# Patient Record
Sex: MALE | Race: WHITE | NOT HISPANIC OR LATINO | Employment: FULL TIME | URBAN - METROPOLITAN AREA
[De-identification: names, ages, dates, MRNs, and addresses within clinical notes are randomized per-mention and may not be internally consistent; named-entity substitution may affect disease eponyms.]

---

## 2017-01-26 PROBLEM — R79.89 LOW TESTOSTERONE LEVEL IN MALE: Status: ACTIVE | Noted: 2017-01-26

## 2017-01-26 PROBLEM — E78.2 MIXED DYSLIPIDEMIA: Status: ACTIVE | Noted: 2017-01-26

## 2017-01-26 PROBLEM — I10 BENIGN ESSENTIAL HTN: Status: ACTIVE | Noted: 2017-01-26

## 2017-08-10 ENCOUNTER — HOSPITAL ENCOUNTER (OUTPATIENT)
Dept: RADIOLOGY | Facility: MEDICAL CENTER | Age: 52
End: 2017-08-10

## 2017-08-13 ENCOUNTER — HOSPITAL ENCOUNTER (INPATIENT)
Facility: MEDICAL CENTER | Age: 52
LOS: 2 days | DRG: 472 | End: 2017-08-15
Attending: NEUROLOGICAL SURGERY | Admitting: NEUROLOGICAL SURGERY
Payer: COMMERCIAL

## 2017-08-13 ENCOUNTER — APPOINTMENT (OUTPATIENT)
Dept: RADIOLOGY | Facility: MEDICAL CENTER | Age: 52
DRG: 472 | End: 2017-08-13
Attending: NEUROLOGICAL SURGERY
Payer: COMMERCIAL

## 2017-08-13 PROBLEM — M50.00 INTERVERTEBRAL CERVICAL DISC DISORDER WITH MYELOPATHY, CERVICAL REGION: Status: ACTIVE | Noted: 2017-08-13

## 2017-08-13 LAB — EKG IMPRESSION: NORMAL

## 2017-08-13 PROCEDURE — 700111 HCHG RX REV CODE 636 W/ 250 OVERRIDE (IP)

## 2017-08-13 PROCEDURE — 160002 HCHG RECOVERY MINUTES (STAT): Performed by: NEUROLOGICAL SURGERY

## 2017-08-13 PROCEDURE — 501838 HCHG SUTURE GENERAL: Performed by: NEUROLOGICAL SURGERY

## 2017-08-13 PROCEDURE — 700102 HCHG RX REV CODE 250 W/ 637 OVERRIDE(OP)

## 2017-08-13 PROCEDURE — 160009 HCHG ANES TIME/MIN: Performed by: NEUROLOGICAL SURGERY

## 2017-08-13 PROCEDURE — 502000 HCHG MISC OR IMPLANTS RC 0278: Performed by: NEUROLOGICAL SURGERY

## 2017-08-13 PROCEDURE — 700101 HCHG RX REV CODE 250

## 2017-08-13 PROCEDURE — 0RG40A0 FUSION OF CERVICOTHORACIC VERTEBRAL JOINT WITH INTERBODY FUSION DEVICE, ANTERIOR APPROACH, ANTERIOR COLUMN, OPEN APPROACH: ICD-10-PCS | Performed by: NEUROLOGICAL SURGERY

## 2017-08-13 PROCEDURE — A9270 NON-COVERED ITEM OR SERVICE: HCPCS

## 2017-08-13 PROCEDURE — 160029 HCHG SURGERY MINUTES - 1ST 30 MINS LEVEL 4: Performed by: NEUROLOGICAL SURGERY

## 2017-08-13 PROCEDURE — 160035 HCHG PACU - 1ST 60 MINS PHASE I: Performed by: NEUROLOGICAL SURGERY

## 2017-08-13 PROCEDURE — 72040 X-RAY EXAM NECK SPINE 2-3 VW: CPT

## 2017-08-13 PROCEDURE — A4314 CATH W/DRAINAGE 2-WAY LATEX: HCPCS | Performed by: NEUROLOGICAL SURGERY

## 2017-08-13 PROCEDURE — 0RB30ZZ EXCISION OF CERVICAL VERTEBRAL DISC, OPEN APPROACH: ICD-10-PCS | Performed by: NEUROLOGICAL SURGERY

## 2017-08-13 PROCEDURE — 0RP104Z REMOVAL OF INTERNAL FIXATION DEVICE FROM CERVICAL VERTEBRAL JOINT, OPEN APPROACH: ICD-10-PCS | Performed by: NEUROLOGICAL SURGERY

## 2017-08-13 PROCEDURE — 700111 HCHG RX REV CODE 636 W/ 250 OVERRIDE (IP): Performed by: NURSE PRACTITIONER

## 2017-08-13 PROCEDURE — 500364 HCHG DISSECT TOOL, MIDAS: Performed by: NEUROLOGICAL SURGERY

## 2017-08-13 PROCEDURE — 500864 HCHG NEEDLE, SPINAL 18G: Performed by: NEUROLOGICAL SURGERY

## 2017-08-13 PROCEDURE — 93010 ELECTROCARDIOGRAM REPORT: CPT | Performed by: INTERNAL MEDICINE

## 2017-08-13 PROCEDURE — 500444 HCHG HEMOSTAT, SURGICEL 2X3: Performed by: NEUROLOGICAL SURGERY

## 2017-08-13 PROCEDURE — C1713 ANCHOR/SCREW BN/BN,TIS/BN: HCPCS | Performed by: NEUROLOGICAL SURGERY

## 2017-08-13 PROCEDURE — 500122 HCHG BOVIE, BLADE: Performed by: NEUROLOGICAL SURGERY

## 2017-08-13 PROCEDURE — 500331 HCHG COTTONOID, SURG PATTIE: Performed by: NEUROLOGICAL SURGERY

## 2017-08-13 PROCEDURE — 160048 HCHG OR STATISTICAL LEVEL 1-5: Performed by: NEUROLOGICAL SURGERY

## 2017-08-13 PROCEDURE — A9270 NON-COVERED ITEM OR SERVICE: HCPCS | Performed by: NURSE PRACTITIONER

## 2017-08-13 PROCEDURE — 4A11X4G MONITORING OF PERIPHERAL NERVOUS ELECTRICAL ACTIVITY, INTRAOPERATIVE, EXTERNAL APPROACH: ICD-10-PCS | Performed by: NEUROLOGICAL SURGERY

## 2017-08-13 PROCEDURE — 160036 HCHG PACU - EA ADDL 30 MINS PHASE I: Performed by: NEUROLOGICAL SURGERY

## 2017-08-13 PROCEDURE — 770001 HCHG ROOM/CARE - MED/SURG/GYN PRIV*

## 2017-08-13 PROCEDURE — 0RG10A0 FUSION OF CERVICAL VERTEBRAL JOINT WITH INTERBODY FUSION DEVICE, ANTERIOR APPROACH, ANTERIOR COLUMN, OPEN APPROACH: ICD-10-PCS | Performed by: NEUROLOGICAL SURGERY

## 2017-08-13 PROCEDURE — 500367 HCHG DRAIN KIT, HEMOVAC: Performed by: NEUROLOGICAL SURGERY

## 2017-08-13 PROCEDURE — 500819 HCHG MARKERS, PASSIVE REFLECTIVE: Performed by: NEUROLOGICAL SURGERY

## 2017-08-13 PROCEDURE — 0RB50ZZ EXCISION OF CERVICOTHORACIC VERTEBRAL DISC, OPEN APPROACH: ICD-10-PCS | Performed by: NEUROLOGICAL SURGERY

## 2017-08-13 PROCEDURE — 502240 HCHG MISC OR SUPPLY RC 0272: Performed by: NEUROLOGICAL SURGERY

## 2017-08-13 PROCEDURE — 93005 ELECTROCARDIOGRAM TRACING: CPT | Performed by: NEUROLOGICAL SURGERY

## 2017-08-13 PROCEDURE — 700102 HCHG RX REV CODE 250 W/ 637 OVERRIDE(OP): Performed by: NURSE PRACTITIONER

## 2017-08-13 PROCEDURE — 160041 HCHG SURGERY MINUTES - EA ADDL 1 MIN LEVEL 4: Performed by: NEUROLOGICAL SURGERY

## 2017-08-13 PROCEDURE — A6402 STERILE GAUZE <= 16 SQ IN: HCPCS | Performed by: NEUROLOGICAL SURGERY

## 2017-08-13 PROCEDURE — 501423 HCHG SPONGE, SURGIFOAM 12X7: Performed by: NEUROLOGICAL SURGERY

## 2017-08-13 PROCEDURE — 502626 HCHG SURGIFLO HEMOSTATIC MATRIX 6ML: Performed by: NEUROLOGICAL SURGERY

## 2017-08-13 DEVICE — PLATE ANTERIOR CERVICAL 25MM (1TX1+2TCX1=3): Type: IMPLANTABLE DEVICE | Status: FUNCTIONAL

## 2017-08-13 DEVICE — SCREW ATL TRNS 4.0X15 SELF TAP VAR (1TX10+2TCX10=30): Type: IMPLANTABLE DEVICE | Status: FUNCTIONAL

## 2017-08-13 DEVICE — IMPLANTABLE DEVICE: Type: IMPLANTABLE DEVICE | Status: FUNCTIONAL

## 2017-08-13 RX ORDER — LIDOCAINE AND PRILOCAINE 25; 25 MG/G; MG/G
1 CREAM TOPICAL
Status: DISCONTINUED | OUTPATIENT
Start: 2017-08-13 | End: 2017-08-15 | Stop reason: HOSPADM

## 2017-08-13 RX ORDER — MORPHINE SULFATE 4 MG/ML
2-4 INJECTION, SOLUTION INTRAMUSCULAR; INTRAVENOUS
Status: DISCONTINUED | OUTPATIENT
Start: 2017-08-13 | End: 2017-08-15 | Stop reason: HOSPADM

## 2017-08-13 RX ORDER — PROMETHAZINE HYDROCHLORIDE 25 MG/1
12.5-25 TABLET ORAL EVERY 4 HOURS PRN
Status: DISCONTINUED | OUTPATIENT
Start: 2017-08-13 | End: 2017-08-15 | Stop reason: HOSPADM

## 2017-08-13 RX ORDER — ACETAMINOPHEN 500 MG
1000 TABLET ORAL EVERY 6 HOURS
Status: DISCONTINUED | OUTPATIENT
Start: 2017-08-13 | End: 2017-08-15 | Stop reason: HOSPADM

## 2017-08-13 RX ORDER — POLYETHYLENE GLYCOL 3350 17 G/17G
1 POWDER, FOR SOLUTION ORAL 2 TIMES DAILY PRN
Status: DISCONTINUED | OUTPATIENT
Start: 2017-08-13 | End: 2017-08-15 | Stop reason: HOSPADM

## 2017-08-13 RX ORDER — BISACODYL 10 MG
10 SUPPOSITORY, RECTAL RECTAL
Status: DISCONTINUED | OUTPATIENT
Start: 2017-08-13 | End: 2017-08-15 | Stop reason: HOSPADM

## 2017-08-13 RX ORDER — PROMETHAZINE HYDROCHLORIDE 25 MG/1
12.5-25 SUPPOSITORY RECTAL EVERY 4 HOURS PRN
Status: DISCONTINUED | OUTPATIENT
Start: 2017-08-13 | End: 2017-08-15 | Stop reason: HOSPADM

## 2017-08-13 RX ORDER — SODIUM CHLORIDE 9 MG/ML
INJECTION, SOLUTION INTRAVENOUS CONTINUOUS
Status: DISCONTINUED | OUTPATIENT
Start: 2017-08-13 | End: 2017-08-15 | Stop reason: HOSPADM

## 2017-08-13 RX ORDER — SODIUM CHLORIDE, SODIUM LACTATE, POTASSIUM CHLORIDE, AND CALCIUM CHLORIDE .6; .31; .03; .02 G/100ML; G/100ML; G/100ML; G/100ML
IRRIGANT IRRIGATION
Status: DISCONTINUED | OUTPATIENT
Start: 2017-08-13 | End: 2017-08-13 | Stop reason: HOSPADM

## 2017-08-13 RX ORDER — TOPIRAMATE 25 MG/1
50 TABLET ORAL EVERY 12 HOURS
Status: DISCONTINUED | OUTPATIENT
Start: 2017-08-13 | End: 2017-08-15 | Stop reason: HOSPADM

## 2017-08-13 RX ORDER — AMOXICILLIN 250 MG
2 CAPSULE ORAL 2 TIMES DAILY
Status: DISCONTINUED | OUTPATIENT
Start: 2017-08-13 | End: 2017-08-15 | Stop reason: HOSPADM

## 2017-08-13 RX ORDER — BUPIVACAINE HYDROCHLORIDE AND EPINEPHRINE 5; 5 MG/ML; UG/ML
INJECTION, SOLUTION EPIDURAL; INTRACAUDAL; PERINEURAL
Status: DISCONTINUED | OUTPATIENT
Start: 2017-08-13 | End: 2017-08-13 | Stop reason: HOSPADM

## 2017-08-13 RX ORDER — ONDANSETRON 4 MG/1
4 TABLET, ORALLY DISINTEGRATING ORAL EVERY 4 HOURS PRN
Status: DISCONTINUED | OUTPATIENT
Start: 2017-08-13 | End: 2017-08-15 | Stop reason: HOSPADM

## 2017-08-13 RX ORDER — OXYCODONE HYDROCHLORIDE 10 MG/1
5-10 TABLET ORAL
Status: DISCONTINUED | OUTPATIENT
Start: 2017-08-13 | End: 2017-08-13

## 2017-08-13 RX ORDER — DEXAMETHASONE SODIUM PHOSPHATE 4 MG/ML
4 INJECTION, SOLUTION INTRA-ARTICULAR; INTRALESIONAL; INTRAMUSCULAR; INTRAVENOUS; SOFT TISSUE EVERY 6 HOURS
Status: DISPENSED | OUTPATIENT
Start: 2017-08-13 | End: 2017-08-14

## 2017-08-13 RX ORDER — TOPIRAMATE 25 MG/1
25 TABLET ORAL 4 TIMES DAILY
Status: DISCONTINUED | OUTPATIENT
Start: 2017-08-13 | End: 2017-08-13

## 2017-08-13 RX ORDER — OXYCODONE HYDROCHLORIDE 10 MG/1
10 TABLET ORAL
Status: DISCONTINUED | OUTPATIENT
Start: 2017-08-13 | End: 2017-08-15 | Stop reason: HOSPADM

## 2017-08-13 RX ORDER — ONDANSETRON 2 MG/ML
4 INJECTION INTRAMUSCULAR; INTRAVENOUS EVERY 4 HOURS PRN
Status: DISCONTINUED | OUTPATIENT
Start: 2017-08-13 | End: 2017-08-15 | Stop reason: HOSPADM

## 2017-08-13 RX ORDER — LIDOCAINE HYDROCHLORIDE 10 MG/ML
0.5 INJECTION, SOLUTION INFILTRATION; PERINEURAL
Status: DISCONTINUED | OUTPATIENT
Start: 2017-08-13 | End: 2017-08-15 | Stop reason: HOSPADM

## 2017-08-13 RX ORDER — ENEMA 19; 7 G/133ML; G/133ML
1 ENEMA RECTAL
Status: DISCONTINUED | OUTPATIENT
Start: 2017-08-13 | End: 2017-08-15 | Stop reason: HOSPADM

## 2017-08-13 RX ORDER — CARISOPRODOL 350 MG/1
350 TABLET ORAL 4 TIMES DAILY
Status: DISCONTINUED | OUTPATIENT
Start: 2017-08-13 | End: 2017-08-15 | Stop reason: HOSPADM

## 2017-08-13 RX ORDER — BUTALBITAL, ACETAMINOPHEN AND CAFFEINE 50; 325; 40 MG/1; MG/1; MG/1
1 TABLET ORAL EVERY 6 HOURS PRN
Status: DISCONTINUED | OUTPATIENT
Start: 2017-08-13 | End: 2017-08-15 | Stop reason: HOSPADM

## 2017-08-13 RX ORDER — OXYCODONE HYDROCHLORIDE AND ACETAMINOPHEN 5; 325 MG/1; MG/1
TABLET ORAL
Status: COMPLETED
Start: 2017-08-13 | End: 2017-08-13

## 2017-08-13 RX ORDER — ZOLPIDEM TARTRATE 5 MG/1
5 TABLET ORAL
Status: DISCONTINUED | OUTPATIENT
Start: 2017-08-14 | End: 2017-08-15 | Stop reason: HOSPADM

## 2017-08-13 RX ORDER — LISINOPRIL 10 MG/1
10 TABLET ORAL
Status: DISCONTINUED | OUTPATIENT
Start: 2017-08-14 | End: 2017-08-15 | Stop reason: HOSPADM

## 2017-08-13 RX ORDER — SODIUM CHLORIDE, SODIUM LACTATE, POTASSIUM CHLORIDE, CALCIUM CHLORIDE 600; 310; 30; 20 MG/100ML; MG/100ML; MG/100ML; MG/100ML
1000 INJECTION, SOLUTION INTRAVENOUS
Status: COMPLETED | OUTPATIENT
Start: 2017-08-13 | End: 2017-08-13

## 2017-08-13 RX ORDER — OXYCODONE HYDROCHLORIDE 10 MG/1
5 TABLET ORAL
Status: DISCONTINUED | OUTPATIENT
Start: 2017-08-13 | End: 2017-08-15 | Stop reason: HOSPADM

## 2017-08-13 RX ORDER — CEFAZOLIN SODIUM 2 G/100ML
2 INJECTION, SOLUTION INTRAVENOUS EVERY 8 HOURS
Status: COMPLETED | OUTPATIENT
Start: 2017-08-13 | End: 2017-08-14

## 2017-08-13 RX ADMIN — SODIUM CHLORIDE, SODIUM LACTATE, POTASSIUM CHLORIDE, CALCIUM CHLORIDE 1000 ML: 600; 310; 30; 20 INJECTION, SOLUTION INTRAVENOUS at 06:37

## 2017-08-13 RX ADMIN — STANDARDIZED SENNA CONCENTRATE AND DOCUSATE SODIUM 2 TABLET: 8.6; 5 TABLET, FILM COATED ORAL at 22:39

## 2017-08-13 RX ADMIN — CARISOPRODOL 350 MG: 350 TABLET ORAL at 16:40

## 2017-08-13 RX ADMIN — CARISOPRODOL 350 MG: 350 TABLET ORAL at 21:00

## 2017-08-13 RX ADMIN — TOPIRAMATE 50 MG: 25 TABLET, FILM COATED ORAL at 22:39

## 2017-08-13 RX ADMIN — OXYCODONE AND ACETAMINOPHEN 1 TABLET: 5; 325 TABLET ORAL at 11:35

## 2017-08-13 RX ADMIN — FENTANYL CITRATE 50 MCG: 50 INJECTION, SOLUTION INTRAMUSCULAR; INTRAVENOUS at 11:34

## 2017-08-13 RX ADMIN — ACETAMINOPHEN 1000 MG: 500 TABLET ORAL at 16:39

## 2017-08-13 RX ADMIN — MORPHINE SULFATE 4 MG: 4 INJECTION INTRAVENOUS at 16:40

## 2017-08-13 RX ADMIN — OXYCODONE HYDROCHLORIDE 10 MG: 10 TABLET ORAL at 22:39

## 2017-08-13 RX ADMIN — CEFAZOLIN SODIUM 2 G: 2 INJECTION, SOLUTION INTRAVENOUS at 16:33

## 2017-08-13 RX ADMIN — DEXAMETHASONE SODIUM PHOSPHATE 4 MG: 4 INJECTION, SOLUTION INTRAMUSCULAR; INTRAVENOUS at 16:40

## 2017-08-13 RX ADMIN — FENTANYL CITRATE 50 MCG: 50 INJECTION, SOLUTION INTRAMUSCULAR; INTRAVENOUS at 11:50

## 2017-08-13 ASSESSMENT — PAIN SCALES - GENERAL
PAINLEVEL_OUTOF10: 6
PAINLEVEL_OUTOF10: 5
PAINLEVEL_OUTOF10: 1
PAINLEVEL_OUTOF10: 7
PAINLEVEL_OUTOF10: 6
PAINLEVEL_OUTOF10: 0

## 2017-08-13 NOTE — IP AVS SNAPSHOT
" Home Care Instructions                                                                                                                  Name:Florencio Cooper  Medical Record Number:[id number]  CSN: [id number]    YOB: 1965   Age: 52 y.o.  Sex: male  HT:1.702 m (5' 7\") WT: 123.9 kg (273 lb 2.4 oz)          Admit Date: 8/13/2017     Discharge Date:   Today's Date: 8/15/2017  Attending Doctor:  Mannie Odonnell M.D.                  Allergies:  Prednisone and Valium            Discharge Instructions         INCISION CARE:  OK to shower with incision covered or uncovered. After shower, pat incision dry and cover with gauze and tape if draining. OK to leave open to air if not draining.   Steri-strips, if applicable can be removed as they fall off on their own naturally.     RESTRICTIONS:  Continue to wear your brace as directed   No lifting greater than 10 pounds, no repetitive bending or twisting  No driving for two weeks, no driving while on narcotic medication or muscle relaxers  No aspirin, blood thinners, NSAIDS (non-steroidal anti-inflammatory medications - aleve, motrin, ibuprofen, celebrex) for two weeks     RECOMMENDATIONS:  Over the counter stool softeners daily while on narcotics  Ambulate often to prevent blood clots in your legs  Continue incentive spirometer hourly   Follow up at [facility] in 2 weeks after surgery.  Please call [phone] to confirm the date, location, and time of your follow up appointment that was made for you.  Cervical Fusion  The neck is the upper portion of your spine. The 7 bones in your neck are referred to as the cervical spine. Cervical fusion is a type of surgery that is done on the cervical spine to relieve pressure on the spinal cord or one or more nerve roots. There are two types of cervical fusion:  · Anterior cervical fusion. This surgery is done through the front (anterior) part of your neck. During the surgery the affected intervertebral disk is " removed to take pressure off the nerves or spinal cord. The area where the disc was removed is filled with a bone graft that causes the vertebral bodies to grow together (fuse) over time.  · Posterior cervical fusion. This surgery is done through the back (posterior) of the neck. The surgery joins two or more neck vertebrae into one solid section of bone. Posterior cervical fusion is most commonly used to treat neck fractures and dislocations and to fix deformities in the curve of the neck.  LET YOUR HEALTH CARE PROVIDER KNOW ABOUT:  1. Any allergies you have.  2. All medicines you are taking, including vitamins, herbs, eyedrops, creams, and over-the-counter medicines.  3. Previous problems you or members of your family have had with the use of anesthetics.  4. Any blood disorders or blood clotting problems you have.  5. Previous surgeries you have had.  6. Medical conditions you have.  RISKS AND COMPLICATIONS  Generally, this is a safe procedure. However, as with any procedure, problems can occur. Possible problems include:   1. Infection.    2. Bleeding with possible need for blood transfusion.    3. Injury to surrounding structures, including nerves.    4. Leakage of cerebrospinal fluid.    5. Blood clots.  6. Temporary breathing difficulties after surgery.  7. Extended hospital stay, especially with posterior cervical fusion.  BEFORE THE PROCEDURE  1. Do not eat or drink for 6-8 hours before the procedure.    2. Take medicines as directed by your surgeon. Ask your surgeon about changing or stopping your regular medicines.    3. You will be given antibiotic medicines to keep the infection rate down.    4. The surgical cut (incision) site on your neck will be marked.    5. Your neck will be cleaned to reduce the risk of infection.  PROCEDURE   The length of the procedure depends on what needs to be done. It usually takes 2 or more hours. For both procedures, you will be given medicine to make you sleep (general  anesthetic). A breathing tube will be placed down your throat.   Anterior Cervical Fusion   1. An incision will usually be made in a skin fold line at the front of your neck, in the area where the fusion will be placed.   2. The neck muscles will be pushed aside.    3. The surgeon will remove the affected, degenerated disk and bone spurs (decompression). This helps to take the pressure off the nerves and spinal cord.    4. The area where the disk was removed is then filled with a plastic spacer implant, bone graft, or both. These implants and bone grafts take the place of the disk and keep the nerve passageway open and clear for the nerves and spinal cord.    5. In most cases, the surgeon will put metal plates, pins, or screws (hardware) in the neck to help stabilize the surgical site and to keep the implants and bone grafts in place. The hardware reduces motion at the surgical site, so the bones can grow together. This provides extra support to the neck.    Posterior Cervical Fusion   · An incision will be made through the back of the neck.    · Two or more neck vertebrae will be joined into one solid section of bone.    · Metal plates and pins or screws may be placed in the neck. These help stabilize the neck, providing extra support to help the bones to grow together more easily.  AFTER THE PROCEDURE  · You will stay in a recovery area until the anesthesia has worn off. Your blood pressure and pulse will be checked often.    · You may continue to receive fluids and medicines, such as antibiotics, through the IV tube for several days after the surgery.    · You may need to wear a neck or back brace for several weeks after surgery, especially when up and out of bed.    · You may be given pain medicine while still in the recovery area. Some pain is normal, but if your pain gets worse, tell your surgeon or nurse.    · Be up and moving as soon as possible after surgery. Physical therapists will help you start walking.     · To prevent blood clots in your legs:  ¨ You may be given compression stockings to wear.    ¨ You may need to take medicine to prevent clots.  · You may be asked to do breathing exercises. This is to prevent a lung infection.    · Most people stay in the hospital for 1-3 days after this surgery.       This information is not intended to replace advice given to you by your health care provider. Make sure you discuss any questions you have with your health care provider.     Document Released: 06/09/2003 Document Revised: 12/23/2014 Document Reviewed: 06/19/2014  [employer] Patient Education ©2016 [employer]  Discharge Instructions    Discharged to home by car with relative. Discharged via walking, hospital escort: Yes.  Special equipment needed: C-Collar    Be sure to schedule a follow-up appointment with your primary care doctor or any specialists as instructed.     Discharge Plan:   Influenza Vaccine Indication: Not indicated: Previously immunized this influenza season and > 8 years of age    I understand that a diet low in cholesterol, fat, and sodium is recommended for good health. Unless I have been given specific instructions below for another diet, I accept this instruction as my diet prescription.   Other diet: Regular    Special Instructions: None    · Is patient discharged on Warfarin / Coumadin?   No     · Is patient Post Blood Transfusion?  No    Depression / Suicide Risk    As you are discharged from this [facility] facility, it is important to learn how to keep safe from harming yourself.    Recognize the warning signs:  · Abrupt changes in personality, positive or negative- including increase in energy   · Giving away possessions  · Change in eating patterns- significant weight changes-  positive or negative  · Change in sleeping patterns- unable to sleep or sleeping all the time   · Unwillingness or inability to communicate  · Depression  · Unusual sadness, discouragement and  loneliness  · Talk of wanting to die  · Neglect of personal appearance   · Rebelliousness- reckless behavior  · Withdrawal from people/activities they love  · Confusion- inability to concentrate     If you or a loved one observes any of these behaviors or has concerns about self-harm, here's what you can do:  · Talk about it- your feelings and reasons for harming yourself  · Remove any means that you might use to hurt yourself (examples: pills, rope, extension cords, firearm)  · Get professional help from the community (Mental Health, Substance Abuse, psychological counseling)  · Do not be alone:Call your Safe Contact- someone whom you trust who will be there for you.  · Call your local CRISIS HOTLINE 931-5148 or 768-583-0000  · Call your local Children's Mobile Crisis Response Team [place] (295) 780-3047 or www.Drywave  · Call the toll free National Suicide Prevention Hotlines   · National Suicide Prevention Lifeline 502-069-GSTJ (5228)  · [place] [facility] Line Network 800-SUICIDE (141-2313)             Discharge Medication Instructions:    Below are the medications your physician expects you to take upon discharge:    Review all your home medications and newly ordered medications with your doctor and/or pharmacist. Follow medication instructions as directed by your doctor and/or pharmacist.    Please keep your medication list with you and share with your physician.               Medication List      START taking these medications        Instructions    Morning Afternoon Evening Bedtime    calcium carbonate 500 MG Chew   Commonly known as:  TUMS        Take 1 Tab by mouth 2 Times a Day.   Dose:  500 mg                        carisoprodol 350 MG Tabs   Last time this was given:  350 mg on 8/15/2017  8:35 AM   Commonly known as:  SOMA        Take 1 Tab by mouth every 8 hours as needed for Muscle Spasms.   Dose:  350 mg                        MethylPREDNISolone 4 MG Tbpk   Commonly known as:  MEDROL  DOSEPAK        Doctor's comments:  Take as directed.   Take 1 Tab by mouth every day.   Dose:  4 mg                        oxycodone immediate-release 5 MG Tabs   Last time this was given:  10 mg on 8/15/2017  8:35 AM   Commonly known as:  ROXICODONE        Take 1-2 Tabs by mouth every four hours as needed.   Dose:  5-10 mg                        senna-docusate 8.6-50 MG Tabs   Last time this was given:  2 Tabs on 8/15/2017  8:34 AM   Commonly known as:  PERICOLACE or SENOKOT S        Take 2 Tabs by mouth 2 Times a Day.   Dose:  2 Tab                          CONTINUE taking these medications        Instructions    Morning Afternoon Evening Bedtime    acetaminophen/caffeine/butalbital 325-40-50 mg -40 MG Tabs   Commonly known as:  FIORICET        TAKE ONE TABLET BY MOUTH DAILY WITH AN OCCASIONAL SECOND DOSE SPARINGLY                        lisinopril 10 MG Tabs   Last time this was given:  10 mg on 8/15/2017  8:34 AM   Commonly known as:  PRINIVIL        TAKE ONE TABLET BY MOUTH DAILY                        testosterone cypionate 200 MG/ML Soln injection   Commonly known as:  DEPO-TESTOSTERONE        INJECT ONE ML INTRAMUSCULARLY EVERY 2 WEEKS                        TOPAMAX PO   Last time this was given:  50 mg on 8/15/2017  8:34 AM        Take  by mouth 4 times a day.                        vitamin D (Ergocalciferol) 58134 UNITS Caps capsule   Commonly known as:  DRISDOL        Take 1 Cap by mouth every 7 days for 12 doses.   Dose:  62232 Units                          STOP taking these medications     INDOCIN PO                    Where to Get Your Medications      Information about where to get these medications is not yet available     ! Ask your nurse or doctor about these medications    - calcium carbonate 500 MG Chew  - carisoprodol 350 MG Tabs  - MethylPREDNISolone 4 MG Tbpk  - oxycodone immediate-release 5 MG Tabs  - senna-docusate 8.6-50 MG Tabs            Instructions           Diet /  Nutrition:    Follow any diet instructions given to you by your doctor or the dietician, including how much salt (sodium) you are allowed each day.    If you are overweight, talk to your doctor about a weight reduction plan.    Activity:    Remain physically active following your doctor's instructions about exercise and activity.    Rest often.     Any time you become even a little tired or short of breath, SIT DOWN and rest.    Worsening Symptoms:    Report any of the following signs and symptoms to the doctor's office immediately:    *Pain of jaw, arm, or neck  *Chest pain not relieved by medication                               *Dizziness or loss of consciousness  *Difficulty breathing even when at rest   *More tired than usual                                       *Bleeding drainage or swelling of surgical site  *Swelling of feet, ankles, legs or stomach                 *Fever (>100ºF)  *Pink or blood tinged sputum  *Weight gain (3lbs/day or 5lbs /week)           *Shock from internal defibrillator (if applicable)  *Palpitations or irregular heartbeats                *Cool and/or numb extremities    Stroke Awareness    Common Risk Factors for Stroke include:    Age  Atrial Fibrillation  Carotid Artery Stenosis  Diabetes Mellitus  Excessive alcohol consumption  High blood pressure  Overweight   Physical inactivity  Smoking    Warning signs and symptoms of a stroke include:    *Sudden numbness or weakness of the face, arm or leg (especially on one side of the body).  *Sudden confusion, trouble speaking or understanding.  *Sudden trouble seeing in one or both eyes.  *Sudden trouble walking, dizziness, loss of balance or coordination.Sudden severe headache with no known cause.    It is very important to get treatment quickly when a stroke occurs. If you experience any of the above warning signs, call 911 immediately.                   Disclaimer         Quit Smoking / Tobacco Use:    I understand the use of any  tobacco products increases my chance of suffering from future heart disease or stroke and could cause other illnesses which may shorten my life. Quitting the use of tobacco products is the single most important thing I can do to improve my health. For further information on smoking / tobacco cessation call a Toll Free Quit Line at 1-141.787.2062 (*National [employer]) or 1-891.706.5981 (American Lung Association) or you can access the web based program at www.lungusa.org.    [place] Tobacco Users Help Line:  (879) 445-2575       Toll Free: 1-640.619.4836  Quit Tobacco Program [facility] (549)507-8812    Crisis Hotline:    [place] Crisis Hotline:  2-963-ZCOWSUB or 1-809.820.1457    [place] Crisis Hotline:    1-471.882.2201 or 574-495-0936    Discharge Survey:   Thank you for choosing [facility]. We hope we did everything we could to make your hospital stay a pleasant one. You may be receiving a phone survey and we would appreciate your time and participation in answering the questions. Your input is very valuable to us in our efforts to improve our service to our patients and their families.        My signature on this form indicates that:    1. I have reviewed and understand the above information.  2. My questions regarding this information have been answered to my satisfaction.  3. I have formulated a plan with my discharge nurse to obtain my prescribed medications for home.                  Disclaimer         __________________________________                     __________       ________                       Patient Signature                                                 Date                    Time

## 2017-08-13 NOTE — PROGRESS NOTES
Updated wife Perla via her cell phone. She will get belongings out of family vehicle and meet patient in his assigned room when PACU stay complete.

## 2017-08-13 NOTE — OP REPORT
DATE OF SERVICE:  08/13/2017    PREOPERATIVE DIAGNOSES:  1.  C5-C7 prior 360 performed elsewhere.  2.  Congenital spinal stenosis.  3.  C2-C3 moderate stenosis.  4.  C4-C5 severe stenosis with loss of lordosis.  5.  C7-T1 severe stenosis with loss of lordosis.    POSTOPERATIVE DIAGNOSES:  1.  C5-C7 prior 360 performed elsewhere.  2.  Congenital spinal stenosis.  3.  C2-C3 moderate stenosis.  4.  C4-C5 severe stenosis with loss of lordosis.  5.  C7-T1 severe stenosis with loss of lordosis.    PRINCIPAL PROCEDURES PERFORMED:  1.  C5-C7 anterior cervical plate removal.  2.  C7-T1 anterior cervical diskectomy and interbody fusion with placement of   a 4WEB titanium Truss interbody cage and placement of a plate.  Please note   that a 22 modifier was added given the extreme difficulty of this portion of   the case.  3.  C4-C5 anterior cervical diskectomy and interbody fusion with placement of   a 4WEB titanium Truss interbody cage and placement of a Medtronic   translational plate.  Please note that 2 Medtronic translational plates were   used, as I did not want to span one Plate from T1-C4.  Two separate plates   would be stronger biomechanically.    SURGEON:  Mannie Odonnell MD    ASSISTANT:  Haider Miller DNP    ANESTHESIA:  The procedure was performed under general anesthesia.    ANESTHESIOLOGIST:  Chente Kaufman MD    COMPLICATIONS:  There were no complications.    FINDINGS:  Include evidence of significant spinal canal compromise and severe   stenosis and stable SSEPs, EMGs, MEPs, and recurrent laryngeal nerve   monitoring.  Please note that remote monitoring was performed by   neuromonitoring associates.    For IV fluids, urine output and estimated blood loss, please see the   anesthesia record.    DISPOSITION:  Patient will be extubated and brought to the recovery room.    CLINICAL HISTORY:  The patient is a 52-year-old male who presents with severe   spinal cord compression and severe left arm pain and  weakness.  The pain,   tingling, numbness, and weakness all came at the same time.  The patient also   had MRI imaging of the cervical spine that showed severe stenosis at C4-C5 and   C7-T1 and moderate stenosis at C2-C3.  X-ray showed apparent ankylosis at   C3-C4.  The patient appeared to have a solid arthrodesis from C5-C7.  Back in   2012, he underwent a C5-C7 ACDF with plate by Dr. Paz and about a few weeks   later underwent a C5-C7 laminectomy and instrumentation and fusion.  The   patient presents with adjacent level of stenosis above and below.  The patient   is still relatively young in his early 50s.  I have recommended treatment and   decompression instrument and fusion above and below.  I felt that this would   likely require a 360.  He was consented for 360.  The plan today was to   complete the anterior portion.  I had prepared for 360 today, but I felt that   if I kept the patient intubated to do a 360 that he would remain intubated.    Therefore, I felt that if we can get away with it, it would be better to   perform a staged procedure.  Risks, benefits, and options were discussed.  The   patient signed a written informed consent.  The patient and the wife   expressed their understanding regarding my plan to do at least the front   today.    DESCRIPTION OF PROCEDURE:  The patient was brought to the operating room and   placed under general anesthesia.  The neck was prepped and draped in the usual   sterile fashion.  Local anesthetic was infiltrated in the skin.  Time-out was   performed.  I made a right-sided skin incision down to the platysma.  The   platysma was incised.  A sharp subplatysmal dissection was performed until I   could palpate the carotid sheath.  At this point, blunt dissection was used to   exploit the natural plane between the carotid sheath laterally and the   trachea and esophagus medially.  The longus colli muscles were undermined and   radiolucent self-retaining retractors  were placed.  The old Synthes plate was   visualized.  The locking mechanism was disengaged and I removed all 6 screws.    There was no evidence of hardware failure.  The old Synthes plate was   removed.  Next, the angle into C7-T1 was quite difficult and there was a large   osteophyte overlying the disk space.  This was removed with a Leksell   rongeur.  The NuVasive table mount cervical retractor was used.  The longest   plate was required, as the patient was quite deep and the angle, as it was   starting to enter the chest, was difficult as well.  Therefore, I had removed   the top part of the disk space which is the bottom part of C7.  This allowed   me to get a nice view into the disk space.  I used an upbiting curette.    Please note that a 22 modifier was added given the extreme difficulty of the   angle.  I did not feel that it was worth the risk of removing part of the   manubrium to gain better access at the C7-T1 and most of the pathology was   coming from the front of the disk, although there was circumferential stenosis   behind the disk as well.  Regardless, I was able to prepare the endplates and   relieve severe stenosis on the dura.  Bilateral C8 nerve roots were   completely decompressed.  Perfect hemostasis was achieved.  I placed an 11 mm   in height lordotic titanium Truss interbody cage packed with tricalcium   phosphate connects putty.  This gently tapped into place and allowed a nice   distraction.  Next, the [place] pins were removed and I secured a Medtronic   translational plate by using four screws.  The locking mechanism was engaged.    There were no changes to SSEPs, EMGs, MEPs, and recurrent laryngeal nerve   monitoring.  Next, the retractors were put at C4-C5.  I used Hebron pins to   distract after I made an annulotomy.  The AM-12 drill bit was used to perform   a diskectomy and prepare the endplates.  I used an AMA drill bit to thin down   the posterior osteophytes down to a thin  eggshell.  I used a small right angle   hook to develop a plane between the dura and the posterior longitudinal   ligament.  I used Kerrison 1 and Kerrison 2 punch to complete the diskectomy,   and complete bilateral foraminotomies.  Next, I placed the appropriate size   lordotic titanium Truss interbody cage packed with tricalcium phosphate   connects putty.  This gently tapped into place and allowed a nice lordosis.    The lordosis was improved at this level.  Next, I secured a Medtronic   translational plate and I used 4 screws.  The locking mechanism was engaged.    AP and lateral fluoroscopy demonstrated nice placement of the hardware.    Perfect hemostasis was achieved.  A subfascial drain was tunneled through a   separate stab wound.  The wound was closed in anatomic layers and a sterile   dressing was applied.  The patient will be scheduled in a week or so to bring   him back to the posterior portion.  This will consist of a C5-C7 posterior   segmental instrumentation removal.  This was the DePuy Mountaineer system.    Next, I would extend the fusion up to C3 and down to T1.  The O-arm will be   used.  A redo laminectomy from C4-T1 will be performed as well.       ____________________________________     MD DENNY CISNEROS / YOON    DD:  08/13/2017 10:47:18  DT:  08/13/2017 11:35:43    D#:  [id number]  Job#:  [id number]

## 2017-08-13 NOTE — OR SURGEON
Operative Report    PreOp Diagnosis: C4,5 severe stenosis, C7,T1 severe stenosis, prior C5-7 360 done elsewhere, severe left arm pain/radiculopathy, left hand weakness, CONGENITAL SPINAL STENOSIS, APPARENT ANKYLOSIS C3,4, MODERATE STENOSIS C2,3    PostOp Diagnosis: same    Procedure(s):  CERVICAL DISK AND FUSION ANTERIOR - C4-T1 With PLATE - Wound Class: Clean with Drain  HARDWARE REMOVAL NEURO, C5-C7 - Wound Class: Clean    Surgeon(s):  Mannie Odonnell M.D.    Anesthesiologist/Type of Anesthesia:  Anesthesiologist: Chente Kaufman M.D./General    Surgical Staff:  Assistant: Haider Miller D.N.P.  Circulator: Pili Molina R.N.  Relief Circulator: Gabriella Kim R.N.  Scrub Person: Toni Birch  Radiology Technologist: Meet Galvan    Specimens:  * No specimens in log *    Estimated Blood Loss: minimal    Findings: severe stenosis relieved, no changes to SSEP'S, EMG'S, MEP'S, RLN MONITORING    Complications: none        8/13/2017 10:36 AM Mannie Odonnell

## 2017-08-13 NOTE — IP AVS SNAPSHOT
" <p align=\"LEFT\"><IMG SRC=\"//[email or web address]$/[email or web address]\" alt=\"Image\" WIDTH=\"50%\" HEIGHT=\"200\" BORDER=\"\"></p>                   Name:Florencio Cooper  Medical Record Number:[id number]  CSN: [id number]    YOB: 1965   Age: 52 y.o.  Sex: male  HT:1.702 m (5' 7\") WT: 123.9 kg (273 lb 2.4 oz)          Admit Date: 8/13/2017     Discharge Date:   Today's Date: 8/15/2017  Attending Doctor:  Mannie Odonnell M.D.                  Allergies:  Prednisone and Valium             Medication List      Take these Medications        Instructions    acetaminophen/caffeine/butalbital 325-40-50 mg -40 MG Tabs   Commonly known as:  FIORICET    TAKE ONE TABLET BY MOUTH DAILY WITH AN OCCASIONAL SECOND DOSE SPARINGLY       calcium carbonate 500 MG Chew   Commonly known as:  TUMS    Take 1 Tab by mouth 2 Times a Day.   Dose:  500 mg       carisoprodol 350 MG Tabs   Commonly known as:  SOMA    Take 1 Tab by mouth every 8 hours as needed for Muscle Spasms.   Dose:  350 mg       lisinopril 10 MG Tabs   Commonly known as:  PRINIVIL    TAKE ONE TABLET BY MOUTH DAILY       MethylPREDNISolone 4 MG Tbpk   Commonly known as:  MEDROL DOSEPAK    Doctor's comments:  Take as directed.   Take 1 Tab by mouth every day.   Dose:  4 mg       oxycodone immediate-release 5 MG Tabs   Commonly known as:  ROXICODONE    Take 1-2 Tabs by mouth every four hours as needed.   Dose:  5-10 mg       senna-docusate 8.6-50 MG Tabs   Commonly known as:  PERICOLACE or SENOKOT S    Take 2 Tabs by mouth 2 Times a Day.   Dose:  2 Tab       testosterone cypionate 200 MG/ML Soln injection   Commonly known as:  DEPO-TESTOSTERONE    INJECT ONE ML INTRAMUSCULARLY EVERY 2 WEEKS       TOPAMAX PO    Take  by mouth 4 times a day.       vitamin D (Ergocalciferol) 52105 UNITS Caps capsule   Commonly known as:  DRISDOL    Take 1 Cap by mouth every 7 days for 12 doses.   Dose:  08446 Units         "

## 2017-08-13 NOTE — IP AVS SNAPSHOT
8/15/2017    Florencio Cooper  [address]  [facility] [address]    Dear Florencio:    [facility] wants to ensure your discharge home is safe and you or your loved ones have had all of your questions answered regarding your care after you leave the hospital.    Below is a list of resources and contact information should you have any questions regarding your hospital stay, follow-up instructions, or active medical symptoms.    Questions or Concerns Regarding… Contact   Medical Questions Related to Your Discharge  (7 days a week, 8am-5pm) Contact a Nurse Care Coordinator   [phone]   Medical Questions Not Related to Your Discharge  (24 hours a day / 7 days a week)  Contact the Nurse Health Line   [phone]    Medications or Discharge Instructions Refer to your discharge packet   or contact your [facility] Primary Care Provider   [phone]   Follow-up Appointment(s) Schedule your appointment via [employer]   or contact Scheduling [phone]   Billing Review your statement via [employer]  or contact Billing [phone]   Medical Records Review your records via [employer]   or contact Medical Records [phone]     You may receive a telephone call within two days of discharge. This call is to make certain you understand your discharge instructions and have the opportunity to have any questions answered. You can also easily access your medical information, test results and upcoming appointments via the [employer] free online health management tool. You can learn more and sign up at [employer]. For assistance setting up your [employer] account, please call [phone].    Once again, we want to ensure your discharge home is safe and that you have a clear understanding of any next steps in your care. If you have any questions or concerns, please do not hesitate to contact us, we are here for you. Thank you for choosing [facility] for your healthcare needs.    Sincerely,    Your [facility] Healthcare Team

## 2017-08-13 NOTE — IP AVS SNAPSHOT
P[employer]enix [employer]T Access Code: Activation code not generated  Current [employer] S&T Status: Active    [employer]  A secure, online tool to manage your health information     [employer] S[employer]T® is a secure, online tool that connects you to your personalized health information from the privacy of your home -- day or night - making it very easy for you to manage your healthcare. Once the activation process is completed, you can even access your medical information using the Phoenix S&T chong, which is available for free in the [employer] Chong store or Google Play store.     Phoenix S&T provides the following levels of access (as shown below):   My Chart Features   [facility] Primary Care Doctor [facility]  Specialists [facility]  Urgent  Care Non-[facility]  Primary Care  Doctor   Email your healthcare team securely and privately 24/7 X X X X   Manage appointments: schedule your next appointment; view details of past/upcoming appointments X      Request prescription refills. X      View recent personal medical records, including lab and immunizations X X X X   View health record, including health history, allergies, medications X X X X   Read reports about your outpatient visits, procedures, consult and ER notes X X X X   See your discharge summary, which is a recap of your hospital and/or ER visit that includes your diagnosis, lab results, and care plan. X X       How to register for [employer] S&T:  1. Go to  [email or web address].  2. Click on the Sign Up Now box, which takes you to the New Member Sign Up page. You will need to provide the following information:  a. Enter your Phoenix S&T Access Code exactly as it appears at the top of this page. (You will not need to use this code after you’ve completed the sign-up process. If you do not sign up before the expiration date, you must request a new code.)   b. Enter your date of birth.   c. Enter your home email address.   d. Click Submit, and follow the next screen’s instructions.  3. Create a Phoenix S&T ID. This will  be your [employer] login ID and cannot be changed, so think of one that is secure and easy to remember.  4. Create a [employer] password. You can change your password at any time.  5. Enter your Password Reset Question and Answer. This can be used at a later time if you forget your password.   6. Enter your e-mail address. This allows you to receive e-mail notifications when new information is available in [employer].  7. Click Sign Up. You can now view your health information.    For assistance activating your [employer] account, call [phone]

## 2017-08-14 ENCOUNTER — APPOINTMENT (OUTPATIENT)
Dept: RADIOLOGY | Facility: MEDICAL CENTER | Age: 52
DRG: 472 | End: 2017-08-14
Attending: NEUROLOGICAL SURGERY
Payer: COMMERCIAL

## 2017-08-14 PROCEDURE — G8979 MOBILITY GOAL STATUS: HCPCS | Mod: CI

## 2017-08-14 PROCEDURE — 700102 HCHG RX REV CODE 250 W/ 637 OVERRIDE(OP): Performed by: NURSE PRACTITIONER

## 2017-08-14 PROCEDURE — 97161 PT EVAL LOW COMPLEX 20 MIN: CPT

## 2017-08-14 PROCEDURE — 700111 HCHG RX REV CODE 636 W/ 250 OVERRIDE (IP): Performed by: NURSE PRACTITIONER

## 2017-08-14 PROCEDURE — 700105 HCHG RX REV CODE 258: Performed by: NURSE PRACTITIONER

## 2017-08-14 PROCEDURE — 72125 CT NECK SPINE W/O DYE: CPT

## 2017-08-14 PROCEDURE — G8980 MOBILITY D/C STATUS: HCPCS | Mod: CI

## 2017-08-14 PROCEDURE — 770001 HCHG ROOM/CARE - MED/SURG/GYN PRIV*

## 2017-08-14 PROCEDURE — G8978 MOBILITY CURRENT STATUS: HCPCS | Mod: CI

## 2017-08-14 PROCEDURE — 72141 MRI NECK SPINE W/O DYE: CPT

## 2017-08-14 PROCEDURE — A9270 NON-COVERED ITEM OR SERVICE: HCPCS | Performed by: NURSE PRACTITIONER

## 2017-08-14 RX ADMIN — ACETAMINOPHEN 1000 MG: 500 TABLET ORAL at 17:09

## 2017-08-14 RX ADMIN — CARISOPRODOL 350 MG: 350 TABLET ORAL at 17:09

## 2017-08-14 RX ADMIN — DEXAMETHASONE SODIUM PHOSPHATE 4 MG: 4 INJECTION, SOLUTION INTRAMUSCULAR; INTRAVENOUS at 01:20

## 2017-08-14 RX ADMIN — STANDARDIZED SENNA CONCENTRATE AND DOCUSATE SODIUM 2 TABLET: 8.6; 5 TABLET, FILM COATED ORAL at 20:24

## 2017-08-14 RX ADMIN — STANDARDIZED SENNA CONCENTRATE AND DOCUSATE SODIUM 2 TABLET: 8.6; 5 TABLET, FILM COATED ORAL at 07:42

## 2017-08-14 RX ADMIN — CEFAZOLIN SODIUM 2 G: 2 INJECTION, SOLUTION INTRAVENOUS at 01:20

## 2017-08-14 RX ADMIN — TOPIRAMATE 50 MG: 25 TABLET, FILM COATED ORAL at 07:42

## 2017-08-14 RX ADMIN — ACETAMINOPHEN 1000 MG: 500 TABLET ORAL at 12:45

## 2017-08-14 RX ADMIN — LISINOPRIL 10 MG: 10 TABLET ORAL at 07:42

## 2017-08-14 RX ADMIN — OXYCODONE HYDROCHLORIDE 10 MG: 10 TABLET ORAL at 06:22

## 2017-08-14 RX ADMIN — OXYCODONE HYDROCHLORIDE 10 MG: 10 TABLET ORAL at 15:09

## 2017-08-14 RX ADMIN — CARISOPRODOL 350 MG: 350 TABLET ORAL at 12:45

## 2017-08-14 RX ADMIN — ACETAMINOPHEN 1000 MG: 500 TABLET ORAL at 06:21

## 2017-08-14 RX ADMIN — CARISOPRODOL 350 MG: 350 TABLET ORAL at 20:24

## 2017-08-14 RX ADMIN — OXYCODONE HYDROCHLORIDE 10 MG: 10 TABLET ORAL at 20:24

## 2017-08-14 RX ADMIN — CARISOPRODOL 350 MG: 350 TABLET ORAL at 07:43

## 2017-08-14 RX ADMIN — OXYCODONE HYDROCHLORIDE 10 MG: 10 TABLET ORAL at 02:32

## 2017-08-14 RX ADMIN — ACETAMINOPHEN 1000 MG: 500 TABLET ORAL at 01:20

## 2017-08-14 RX ADMIN — SODIUM CHLORIDE: 9 INJECTION, SOLUTION INTRAVENOUS at 01:20

## 2017-08-14 RX ADMIN — TOPIRAMATE 50 MG: 25 TABLET, FILM COATED ORAL at 20:24

## 2017-08-14 ASSESSMENT — PAIN SCALES - GENERAL
PAINLEVEL_OUTOF10: 4
PAINLEVEL_OUTOF10: 7
PAINLEVEL_OUTOF10: 3
PAINLEVEL_OUTOF10: 5

## 2017-08-14 ASSESSMENT — COGNITIVE AND FUNCTIONAL STATUS - GENERAL
TURNING FROM BACK TO SIDE WHILE IN FLAT BAD: A LITTLE
SUGGESTED CMS G CODE MODIFIER MOBILITY: CI
MOBILITY SCORE: 23

## 2017-08-14 ASSESSMENT — ENCOUNTER SYMPTOMS
SENSORY CHANGE: 1
NECK PAIN: 1
FOCAL WEAKNESS: 1

## 2017-08-14 ASSESSMENT — GAIT ASSESSMENTS
GAIT LEVEL OF ASSIST: SUPERVISED
DISTANCE (FEET): 250

## 2017-08-14 NOTE — PROGRESS NOTES
Progress Note               Author: Mannie Odonnell Date & Time created: 2017  7:26 AM     Interval History:  Preop left arm pain and tingling and numbness is improved.    Preop left intrinsic weakness is the same.      Patient has some new hoarseness.    Review of Systems:  Review of Systems   Musculoskeletal: Positive for neck pain.   Neurological: Positive for sensory change and focal weakness.       Physical Exam:  Physical Exam   Neurological: He is alert. He has normal reflexes. He displays normal reflexes. No cranial nerve deficit. He exhibits normal muscle tone. Coordination normal.   + hoarseness, Good volume to voice, however.    Motor is full except left hand intrinsics 2-3/5,  4/5, + atrophy left first dorsal interosseus  Sensory - slightly decreased left pinky / ring finger.    Dressing is cdi.       Labs:        Invalid input(s): [id number]      No results for input(s): SODIUM, POTASSIUM, CHLORIDE, CO2, BUN, CREATININE, MAGNESIUM, PHOSPHORUS, CALCIUM in the last 72 hours.  No results for input(s): ALTSGPT, ASTSGOT, ALKPHOSPHAT, TBILIRUBIN, DBILIRUBIN, GAMMAGT, AMYLASE, LIPASE, ALB, PREALBUMIN, GLUCOSE in the last 72 hours.  No results for input(s): RBC, HEMOGLOBIN, HEMATOCRIT, PLATELETCT, PROTHROMBTM, APTT, INR, IRON, FERRITIN, TOTIRONBC in the last 72 hours.      Hemodynamics:  Temp (24hrs), Av.3 °C (97.3 °F), Min:36.2 °C (97.1 °F), Max:36.4 °C (97.5 °F)  Temperature: 36.2 °C (97.2 °F)  Pulse  Av.8  Min: 51  Max: 67Heart Rate (Monitored): (!) 48  Blood Pressure: 132/69 mmHg, NIBP: 129/77 mmHg     Respiratory:    Respiration: 16, Pulse Oximetry: 97 %        RUL Breath Sounds: Clear, RML Breath Sounds: Clear, RLL Breath Sounds: Diminished, COBY Breath Sounds: Clear, LLL Breath Sounds: Diminished  Fluids:    Intake/Output Summary (Last 24 hours) at 17 0726  Last data filed at 17 0600   Gross per 24 hour   Intake   1110 ml   Output   2590 ml   Net  -1480 ml         GI/Nutrition:  Orders Placed This Encounter   Procedures   • DIET ORDER     Standing Status: Standing      Number of Occurrences: 1      Standing Expiration Date:      Order Specific Question:  Diet:     Answer:  Regular [1]     Medical Decision Making, by Problem:  Active Hospital Problems    Diagnosis   • Intervertebral cervical disc disorder with myelopathy, 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    Will order CT c spine to evaluate hardware.  Will order MRI c spine to evaluate the canal and the left C7-T1 foramen.    Posterior surgery was aborted because of probable need to remain intubated had 360 been performed.    If CT c spine / MRI c spine look good, will plan discharge tomorrow and staged surgery.        Core Measures

## 2017-08-14 NOTE — DIETARY
NUTRITION SERVICES: BMI - Pt with BMI >40 (=42.77). Weight loss counseling not appropriate in acute care setting. RECOMMEND - Referral to outpatient nutrition services for weight management after D/C.

## 2017-08-14 NOTE — PROGRESS NOTES
Patient alert and oriented x4.   Denies N/T. Took medications. Complains of pain 7/10, medicated per MAR.   Diet tolerated, denies N/V.   Turns self, Anterior cervical incision CDI with hemovac attached.  x1 assist with ambulation.   Oxygen 97% on 2L NC. Lung sounds clear.   Gallo in place with statlock, draining clear yellow urine to gravity, last BM PTA, not currently passing flatus, hypoactive bowel sounds, abdomen rounded.  Ancef antibiotic scheduled.    POC discussed, questions and concerns addressed, hourly rounding in place, communication board updated, bed locked in lowest position.

## 2017-08-14 NOTE — PROGRESS NOTES
Neurosurgery Progress Note    Patient reports improvement in pain in left arm compared to preop.  Continues with tingling through ulnar aspect of left arm. Left finger intrinsics still weak.  D/C escalante today.   Voice is slightly hoarse, can swallow full liquids.  Pain controlled on PO pain meds.     Exam:  VSS  A&Ox4, NAD  Mild hoarseness of voice.   Trachea midline, no difficulty swallowing - no coughing or choking while drinking water   No nuchal rigidity   NM: 5/5 deltoid, biceps, triceps, 4/5 left handgrip, 3/5 intrinsics left side.   Sensation intact and equal throughout all four extremities, except slightly decreased sensation ulnar aspect of left hand.  Abdomen: soft, non-tender  Pulmonary: non-labored breathing on room air, normal respiratory effort  No LE edema, erythema, cyanosis, clubbing  Calves non-tender to compression bilat  Incision CDI, no halo sign   C-collar being worn appropriately  Drain: 140/shift      BP  Min: 126/75  Max: 155/73  Temp  Av.3 °C (97.4 °F)  Min: 36.2 °C (97.1 °F)  Max: 36.7 °C (98 °F)  Pulse  Av  Min: 51  Max: 67  Resp  Av.6  Min: 13  Max: 16  SpO2  Av.3 %  Min: 95 %  Max: 100 %    No Data Recorded        No results for input(s): SODIUM, POTASSIUM, CHLORIDE, CO2, GLUCOSE, BUN, CPKTOTAL in the last 72 hours.            Intake/Output       17 07 - 17 0659 17 07 - 08/15/17 0659      0659 Total 0135-3175 6068-5601 Total       Intake    P.O.  30  180 210  --  -- --    P.O. 30 180 210 -- -- --    I.V.  --  900 900  --  -- --    IV Volume (NS) -- 800 800 -- -- --    IV Piggyback Volume (Ancef) -- 100 100 -- -- --    Total Intake 30 1080 1110 -- -- --       Output    Urine  850  1600 2450  --  -- --    Indwelling Cathether 850 1600 2450 -- -- --    Drains  80  60 140  --  -- --    Hemovac 1 80 60 140 -- -- --    Stool  --  -- --  --  -- --    Number of Times Stooled -- -- -- 0 x -- 0 x    Total Output 201 6071 8705 -- -- --        Net I/O     -900 -580 -1480 -- -- --            Intake/Output Summary (Last 24 hours) at 08/14/17 0906  Last data filed at 08/14/17 0600   Gross per 24 hour   Intake   1110 ml   Output   2590 ml   Net  -1480 ml            • lidocaine-prilocaine  1 Application Once PRN      Or   • lidocaine  0.5 mL Once PRN     • acetaminophen/caffeine/butalbital 325-40-50 mg  1 Tab Q6HRS PRN     • carisoprodol  350 mg 4X/DAY     • lisinopril  10 mg Q DAY     • MD ALERT...Do not administer NSAIDS or ASPIRIN unless ORDERED By Neurosurgery  1 Each PRN     • senna-docusate  2 Tab BID     • polyethylene glycol/lytes  1 Packet BID PRN     • magnesium hydroxide  30 mL QDAY PRN     • bisacodyl  10 mg Q24HRS PRN     • fleet  1 Each Once PRN     • NS   Continuous 100 mL/hr at 08/14/17 0120   • acetaminophen  1,000 mg Q6HRS     • morphine injection  2-4 mg Q3HRS PRN     • ondansetron  4 mg Q4HRS PRN     • ondansetron  4 mg Q4HRS PRN     • promethazine  12.5-25 mg Q4HRS PRN     • promethazine  12.5-25 mg Q4HRS PRN     • zolpidem  5 mg HS PRN - MR X 1     • oxycodone immediate-release  5 mg Q3HRS PRN      Or   • oxycodone immediate-release  10 mg Q3HRS PRN     • topiramate  50 mg Q12HRS         Assessment and Plan:  Hospital day #2  POD #1 s/p C4-C5, C7-T1 ACDF with YUKI C5-C7  Prophylactic anticoagulation: no         Start date/time: 24 hours post drain out    Plan:  D/c escalante   CT/MRI of Cspine today to evaluate hardware and left C7-T1 foramen.   Encourage increase ambulation.   Continue pain control on PO pain meds.   Likely d/c to home tomorrow pending imaging results.   Drain out when meets criteria.   PT/OT evaluation today.   Diet as tolerated.     Haider Miller NP

## 2017-08-14 NOTE — CARE PLAN
Problem: Respiratory:  Goal: Respiratory status will improve  Outcome: PROGRESSING AS EXPECTED  Intervention: Assess and monitor pulmonary status  Pt requiring 2L NC O2 at night to keep oxygen saturations above 90%.       Problem: Pain Management  Goal: Pain level will decrease to patient’s comfort goal  Intervention: Follow pain managment plan developed in collaboration with patient and Interdisciplinary Team  Pt's pain managed well with Oxycodone 10 mg prn.

## 2017-08-14 NOTE — THERAPY
"51 y/o male adm for C4-T1 anterior cervical fusion in a [place] J collar. No motor/sensory problems BLE, no lob during level ground ambulation with No ad. Cervical education given. No further acute PT services required at this time.    Physical Therapy Evaluation completed.   Bed Mobility:  Supine to Sit: Supervised  Transfers: Sit to Stand: Supervised  Gait: Level Of Assist: Supervised with No Equipment Needed       Plan of Care: Patient with no further skilled PT needs in the acute care setting at this time  Discharge Recommendations: Equipment: No Equipment Needed. Post-acute therapy Currently anticipate no further skilled therapy needs once patient is discharged from the inpatient setting.    See \"Rehab Therapy-Acute\" Patient Summary Report for complete documentation.     "

## 2017-08-14 NOTE — PROGRESS NOTES
Patient is refusing bed alarm despite education about safety. A&O x4, ambulates x1 assist, calls appropriately for assistance, steady on his feet, bed in lowest position, call light within reach, appropriate signs in place.

## 2017-08-15 VITALS
BODY MASS INDEX: 42.87 KG/M2 | HEART RATE: 52 BPM | TEMPERATURE: 98 F | SYSTOLIC BLOOD PRESSURE: 148 MMHG | RESPIRATION RATE: 16 BRPM | DIASTOLIC BLOOD PRESSURE: 77 MMHG | WEIGHT: 273.15 LBS | HEIGHT: 67 IN | OXYGEN SATURATION: 95 %

## 2017-08-15 PROCEDURE — G8988 SELF CARE GOAL STATUS: HCPCS | Mod: CI

## 2017-08-15 PROCEDURE — G8987 SELF CARE CURRENT STATUS: HCPCS | Mod: CI

## 2017-08-15 PROCEDURE — A9270 NON-COVERED ITEM OR SERVICE: HCPCS | Performed by: NURSE PRACTITIONER

## 2017-08-15 PROCEDURE — 97165 OT EVAL LOW COMPLEX 30 MIN: CPT

## 2017-08-15 PROCEDURE — G8989 SELF CARE D/C STATUS: HCPCS | Mod: CI

## 2017-08-15 PROCEDURE — 700102 HCHG RX REV CODE 250 W/ 637 OVERRIDE(OP): Performed by: NURSE PRACTITIONER

## 2017-08-15 RX ORDER — METHYLPREDNISOLONE 4 MG/1
4 TABLET ORAL
Status: DISCONTINUED | OUTPATIENT
Start: 2017-08-15 | End: 2017-08-15 | Stop reason: HOSPADM

## 2017-08-15 RX ORDER — CALCIUM CARBONATE 500 MG/1
500 TABLET, CHEWABLE ORAL 2 TIMES DAILY
Qty: 60 TAB | Refills: 2 | Status: SHIPPED | OUTPATIENT
Start: 2017-08-15 | End: 2019-05-21

## 2017-08-15 RX ORDER — METHYLPREDNISOLONE 4 MG/1
8 TABLET ORAL
Status: DISCONTINUED | OUTPATIENT
Start: 2017-08-15 | End: 2017-08-15 | Stop reason: HOSPADM

## 2017-08-15 RX ORDER — METHYLPREDNISOLONE 4 MG/1
8 TABLET ORAL
Status: COMPLETED | OUTPATIENT
Start: 2017-08-15 | End: 2017-08-15

## 2017-08-15 RX ORDER — AMOXICILLIN 250 MG
2 CAPSULE ORAL 2 TIMES DAILY
Qty: 30 TAB | Refills: 0 | Status: SHIPPED | OUTPATIENT
Start: 2017-08-15 | End: 2017-08-15

## 2017-08-15 RX ORDER — CARISOPRODOL 350 MG/1
350 TABLET ORAL EVERY 8 HOURS PRN
Qty: 90 TAB | Refills: 0 | Status: ON HOLD | OUTPATIENT
Start: 2017-08-15 | End: 2017-11-22

## 2017-08-15 RX ORDER — METHYLPREDNISOLONE 4 MG/1
4 TABLET ORAL DAILY
Qty: 1 KIT | Refills: 0 | Status: SHIPPED | OUTPATIENT
Start: 2017-08-15 | End: 2017-08-15

## 2017-08-15 RX ORDER — METHYLPREDNISOLONE 4 MG/1
4 TABLET ORAL
Status: DISCONTINUED | OUTPATIENT
Start: 2017-08-17 | End: 2017-08-15 | Stop reason: HOSPADM

## 2017-08-15 RX ORDER — CARISOPRODOL 350 MG/1
350 TABLET ORAL EVERY 8 HOURS PRN
Qty: 60 TAB | Refills: 0 | Status: SHIPPED | OUTPATIENT
Start: 2017-08-15 | End: 2017-08-15

## 2017-08-15 RX ORDER — OXYCODONE HYDROCHLORIDE 5 MG/1
5-10 TABLET ORAL EVERY 4 HOURS PRN
Qty: 45 TAB | Refills: 0 | Status: SHIPPED | OUTPATIENT
Start: 2017-08-15 | End: 2017-08-15

## 2017-08-15 RX ORDER — OXYCODONE HYDROCHLORIDE 5 MG/1
5-10 TABLET ORAL EVERY 4 HOURS PRN
Qty: 45 TAB | Refills: 0 | Status: ON HOLD | OUTPATIENT
Start: 2017-08-15 | End: 2017-11-22

## 2017-08-15 RX ORDER — METHYLPREDNISOLONE 4 MG/1
4 TABLET ORAL
Status: DISCONTINUED | OUTPATIENT
Start: 2017-08-16 | End: 2017-08-15 | Stop reason: HOSPADM

## 2017-08-15 RX ORDER — METHYLPREDNISOLONE 4 MG/1
4 TABLET ORAL DAILY
Qty: 1 KIT | Refills: 0 | Status: SHIPPED | OUTPATIENT
Start: 2017-08-15 | End: 2017-10-13

## 2017-08-15 RX ORDER — AMOXICILLIN 250 MG
2 CAPSULE ORAL 2 TIMES DAILY
Qty: 30 TAB | Refills: 0 | Status: ON HOLD | OUTPATIENT
Start: 2017-08-15 | End: 2017-11-22

## 2017-08-15 RX ORDER — CALCIUM CARBONATE 500 MG/1
500 TABLET, CHEWABLE ORAL 2 TIMES DAILY
Status: DISCONTINUED | OUTPATIENT
Start: 2017-08-15 | End: 2017-08-15 | Stop reason: HOSPADM

## 2017-08-15 RX ADMIN — OXYCODONE HYDROCHLORIDE 10 MG: 10 TABLET ORAL at 08:35

## 2017-08-15 RX ADMIN — STANDARDIZED SENNA CONCENTRATE AND DOCUSATE SODIUM 2 TABLET: 8.6; 5 TABLET, FILM COATED ORAL at 08:34

## 2017-08-15 RX ADMIN — CALCIUM CARBONATE 500 MG: 500 TABLET ORAL at 09:45

## 2017-08-15 RX ADMIN — CARISOPRODOL 350 MG: 350 TABLET ORAL at 08:35

## 2017-08-15 RX ADMIN — LISINOPRIL 10 MG: 10 TABLET ORAL at 08:34

## 2017-08-15 RX ADMIN — ACETAMINOPHEN 1000 MG: 500 TABLET ORAL at 05:35

## 2017-08-15 RX ADMIN — OXYCODONE HYDROCHLORIDE 10 MG: 10 TABLET ORAL at 05:35

## 2017-08-15 RX ADMIN — TOPIRAMATE 50 MG: 25 TABLET, FILM COATED ORAL at 08:34

## 2017-08-15 RX ADMIN — METHYLPREDNISOLONE 8 MG: 4 TABLET ORAL at 09:00

## 2017-08-15 ASSESSMENT — LIFESTYLE VARIABLES
HAVE YOU EVER FELT YOU SHOULD CUT DOWN ON YOUR DRINKING: NO
ON A TYPICAL DAY WHEN YOU DRINK ALCOHOL HOW MANY DRINKS DO YOU HAVE: 1
EVER FELT BAD OR GUILTY ABOUT YOUR DRINKING: NO
EVER_SMOKED: NEVER
HOW MANY TIMES IN THE PAST YEAR HAVE YOU HAD 5 OR MORE DRINKS IN A DAY: 0
HAVE PEOPLE ANNOYED YOU BY CRITICIZING YOUR DRINKING: NO
TOTAL SCORE: 0
CONSUMPTION TOTAL: INCOMPLETE
TOTAL SCORE: 0
ALCOHOL_USE: YES
EVER HAD A DRINK FIRST THING IN THE MORNING TO STEADY YOUR NERVES TO GET RID OF A HANGOVER: NO
TOTAL SCORE: 0

## 2017-08-15 ASSESSMENT — PATIENT HEALTH QUESTIONNAIRE - PHQ9
SUM OF ALL RESPONSES TO PHQ QUESTIONS 1-9: 0
SUM OF ALL RESPONSES TO PHQ9 QUESTIONS 1 AND 2: 0
1. LITTLE INTEREST OR PLEASURE IN DOING THINGS: NOT AT ALL
2. FEELING DOWN, DEPRESSED, IRRITABLE, OR HOPELESS: NOT AT ALL

## 2017-08-15 ASSESSMENT — ACTIVITIES OF DAILY LIVING (ADL): TOILETING: INDEPENDENT

## 2017-08-15 ASSESSMENT — PAIN SCALES - GENERAL
PAINLEVEL_OUTOF10: 7
PAINLEVEL_OUTOF10: 0

## 2017-08-15 NOTE — PROGRESS NOTES
Pt A&Ox4, reporting some pain, medicated per MAR.  C collar in place, dressing to anterior neck CDI. No s/s of swelling, denies SOB and difficulty swallowing.  Numbness in L hand and arm, present before surgery.   No BM since admit, pt reports he has not eaten much but liquids, + BS, no flatus, stool softeners provided.   Discoloration noted on BLE, blanching, pulses present.   POC discussed, no further needs at this time, call light within reach, bed alarm armed.

## 2017-08-15 NOTE — PROGRESS NOTES
Pt. A/Ox4, pain 5/10. N/T in R hand up forearm. Steri strips on sx site, clean, dry intact. IV patent. Ambulating independently, plans for d/c after OT comes by this AM. Poc discussed, call light within reach.

## 2017-08-15 NOTE — PROGRESS NOTES
Pt. Being d/c'd home with s/o. PIV removed intact. D/c education given to pt. and family and both verbalized an understanding of instructions and prescriptions.

## 2017-08-15 NOTE — PROGRESS NOTES
CT c spine and MRI c spine reviewed.    CT c spine shows proper placement of hardware and looks good.    MRI c spine shows good decompression at C4,5 and C7-T1 with expected postop changes.    I feel that the decompression is good at both levels and will plan to send patient home after OT today gives him exercises at home to further develop his strength in the left hand.

## 2017-08-15 NOTE — CARE PLAN
Problem: Venous Thromboembolism (VTW)/Deep Vein Thrombosis (DVT) Prevention:  Goal: Patient will participate in Venous Thrombosis (VTE)/Deep Vein Thrombosis (DVT)Prevention Measures  Outcome: PROGRESSING AS EXPECTED  SCDs on.     Problem: Respiratory:  Goal: Respiratory status will improve  Outcome: PROGRESSING AS EXPECTED  Educated on use of IS. Pt demonstrated and pulled 1500 effectively. Pt is coughing up thick white phlegm.     Problem: Mobility  Goal: Risk for activity intolerance will decrease  Outcome: PROGRESSING AS EXPECTED  Pt ambulatory without assistance or any devices.

## 2017-08-15 NOTE — DISCHARGE SUMMARY
DATE OF ADMISSION:  08/13/2017.    DATE OF DISCHARGE:  08/15/2017.    ADMITTING DIAGNOSES:  1.  C5-C7 prior 360 surgery performed elsewhere.  2.  Congenital spinal stenosis.  3.  C2-C3 moderate stenosis.  4.  C4-C5 severe stenosis with loss of lordosis.  5.  C7-T1 severe stenosis with loss of lordosis.    PROCEDURES PERFORMED:  1.  C5-C7 anterior cervical plate removal.  2.  C7-T1 anterior cervical diskectomy and fusion.  3.  C4-C5 anterior cervical diskectomy and fusion.    DISCHARGE DIAGNOSES:  1.  C5-C7 prior 360 surgery performed elsewhere.  2.  Congenital spinal stenosis.  3.  C2-C3 moderate stenosis.  4.  C4-C5 severe stenosis with loss of lordosis.  5.  C7-T1 severe stenosis with loss of lordosis, status post the   aforementioned procedures.    HOSPITAL COURSE:  The patient was admitted for this procedure.  He had been   experiencing severe progressive left upper extremity pain, weakness and   paresthesias including significant weakness and loss of function of the left   hand.  Postoperatively, he transitioned to the postanesthesia care unit and   then to the neurosurgical floor.  He claims resolution of his severe left   upper extremity pain.  He claims of some continued paresthesias and weakness   in the left hand.  He has had no postoperative complications.  He has done   very well.  He has been able to gradually progress in his ability to ambulate   and participate in ADLs.  He is wearing a cervical collar and tolerating this   well.  He is tolerating an oral diet.  He is voiding.  He has got no nausea or   vomiting.  His pain is managed with oral pain medications and muscle   relaxers.  His incision is well approximated without signs of complication.    We did a postoperative CT scan and MRI of the cervical spine, which shows good   decompression of the C4-C5, and C7-T1 levels and appropriate placement of   hardware without signs of complication.  He is ready to go.  He meets all   criteria to be  appropriately discharged home under the care of his family.    DISCHARGE INSTRUCTIONS:  The patient is being discharged to home.  He may   resume regular activity with restrictions.  He should not lift more than 10   pounds.  He should not bend or twist.  He should wear a cervical collar at all   times except when showering.  It is okay for him to shower tomorrow with the   Steri-Strips over his incisions.  He may allow the water to run over his   incision and pat it dry.  He should not submerge, rub, scrub or place   additional dressings over his incision.  He was instructed to manage his pain   with oral pain medications and muscle relaxers as needed weaning down as able.    He was also instructed to follow his bowels and make sure he has a bowel   movement every 1-2 days.  He may resume a regular diet.  He was sent home with   prescriptions for Oxycodone 5-10 mg p.o. q. 4 hours p.r.n. pain, Soma 350 mg   every 8 hours as needed for muscle spasm, Tums 500 mg twice daily for 3   months, Senna with docusate sodium 8.6/50 two tabs twice daily to prevent   constipation, Medrol Dosepak.  He claims a history of cramping with the   previous Medrol Dosepak.  We will monitor for this.  He will discontinue the   Medrol Dosepak if he experiences severe body cramping.  He will follow up with   us in approximately 2 weeks.  He was instructed to call should he have any of   the following blood, pus, excessive drainage, redness or separation of   incision edges, increased swelling, uncontrolled pain, nausea, vomiting,   inability to void and/or constipation, fever, shortness of breath, heart   palpitations, extremity swelling and/or discomfort.  He was also instructed to   call us at any time should he have any questions or concerns.       ____________________________________     TANA Platt / YOON    DD:  08/15/2017 09:24:14  DT:  08/15/2017 10:24:27    D#:  [id number]  Job#:  [id number]

## 2017-08-15 NOTE — PROGRESS NOTES
Neurosurgery Progress Note    Continues with improvement in pain in left arm compared to preop.  Continues with tingling through ulnar aspect of left arm. Left finger intrinsics still weak.  Voiding, tolerating PO  Voice is slightly hoarse, can swallow full liquids.  Pain controlled on PO pain meds.   MRI shows good decompression at C4-5, C7-T1  CT scan shows appropriate placement of hardware without complication.     Exam:  VSS  A&Ox4, NAD  Mild hoarseness of voice.   Trachea midline, no difficulty swallowing - no coughing or choking while drinking water   No nuchal rigidity   NM: 5/5 deltoid, biceps, triceps, 4/5 left handgrip, 2-3/5 intrinsics left side.   Sensation intact and equal throughout all four extremities, except tingles to light touch ulnar hand, slightly decreased to light touch radial hand  Abdomen: soft, non-tender  Pulmonary: non-labored breathing on room air, normal respiratory effort  No LE edema, erythema, cyanosis, clubbing  Calves non-tender to compression bilat  Incision welll approximated, no erythema, drainage. Steri strips in place.   C-collar being worn appropriately  Drain: out      BP  Min: 126/75  Max: 155/73  Temp  Av.3 °C (97.4 °F)  Min: 36.2 °C (97.1 °F)  Max: 36.7 °C (98 °F)  Pulse  Av  Min: 51  Max: 67  Resp  Av.6  Min: 13  Max: 16  SpO2  Av.3 %  Min: 95 %  Max: 100 %    No Data Recorded        No results for input(s): SODIUM, POTASSIUM, CHLORIDE, CO2, GLUCOSE, BUN, CPKTOTAL in the last 72 hours.            Intake/Output       17 0700 - 08/15/17 0659 08/15/17 07 - 17 0659       7360-5797 Total 0-0659 Total       Intake    P.O.  --  400 400  --  -- --    P.O. -- 400 400 -- -- --    Total Intake -- 400 400 -- -- --       Output    Urine  --  0 0  --  -- --    Number of Times Voided -- 1 x 1 x -- -- --    Void (ml) -- 0 0 -- -- --    Stool  --  -- --  --  -- --    Number of Times Stooled 0 x 0 x 0 x 0 x -- 0 x    Total Output --  0 0 -- -- --       Net I/O     -- 400 400 -- -- --            Intake/Output Summary (Last 24 hours) at 08/15/17 0834  Last data filed at 08/15/17 0600   Gross per 24 hour   Intake    400 ml   Output      0 ml   Net    400 ml            • lidocaine-prilocaine  1 Application Once PRN      Or   • lidocaine  0.5 mL Once PRN     • acetaminophen/caffeine/butalbital 325-40-50 mg  1 Tab Q6HRS PRN     • carisoprodol  350 mg 4X/DAY     • lisinopril  10 mg Q DAY     • MD ALERT...Do not administer NSAIDS or ASPIRIN unless ORDERED By Neurosurgery  1 Each PRN     • senna-docusate  2 Tab BID     • polyethylene glycol/lytes  1 Packet BID PRN     • magnesium hydroxide  30 mL QDAY PRN     • bisacodyl  10 mg Q24HRS PRN     • fleet  1 Each Once PRN     • NS   Continuous 100 mL/hr at 08/14/17 0120   • acetaminophen  1,000 mg Q6HRS     • morphine injection  2-4 mg Q3HRS PRN     • ondansetron  4 mg Q4HRS PRN     • ondansetron  4 mg Q4HRS PRN     • promethazine  12.5-25 mg Q4HRS PRN     • promethazine  12.5-25 mg Q4HRS PRN     • zolpidem  5 mg HS PRN - MR X 1     • oxycodone immediate-release  5 mg Q3HRS PRN      Or   • oxycodone immediate-release  10 mg Q3HRS PRN     • topiramate  50 mg Q12HRS         Assessment and Plan:  Hospital day #3  POD #2 s/p C4-C5, C7-T1 ACDF with YUKI C5-C7  Prophylactic anticoagulation: no         Start date/time: 24 hours post drain out    Plan:  D/C to home today after OT eval and provides recommendations for home exercises for left hand.   Discussed d/c instructions.     INCISION CARE:  OK to shower with incision covered or uncovered. After shower, pat incision dry and cover with gauze and tape if draining. OK to leave open to air if not draining.   Steri-strips, if applicable can be removed as they fall off on their own naturally.     RESTRICTIONS:  Continue to wear your brace as directed   No lifting greater than 10 pounds, no repetitive bending or twisting  No driving for two weeks, no driving while on  narcotic medication or muscle relaxers  No aspirin, blood thinners, NSAIDS (non-steroidal anti-inflammatory medications - aleve, motrin, ibuprofen, celebrex) for two weeks     RECOMMENDATIONS:  Over the counter stool softeners daily while on narcotics  Ambulate often to prevent blood clots in your legs  Continue incentive spirometer hourly   Follow up at [facility] in 2 weeks after surgery.    Please call [phone] to confirm the date, location, and time of your follow up appointment that was made for you.       Haider Miller, NP

## 2017-08-15 NOTE — THERAPY
"Occupational Therapy Evaluation completed.   Functional Status:  Supervision  Plan of Care: Patient with no further skilled OT needs in the acute care setting at this time  Discharge Recommendations:  Equipment: No Equipment Needed. Post-acute therapy OP skilled therapy needs focused on L UE once patient is discharged from the inpatient setting.    Patient presents and reports at / near functional baseline necessitating Supervision with ADLs and mobility with no AE. Patient has no further skilled OT needs at this time. Eval only.     Addressed L UE weakness and sensation changes. Reviewed hand exercises.      See \"Rehab Therapy-Acute\" Patient Summary Report for complete documentation.    "

## 2017-08-15 NOTE — DISCHARGE INSTRUCTIONS
INCISION CARE:  OK to shower with incision covered or uncovered. After shower, pat incision dry and cover with gauze and tape if draining. OK to leave open to air if not draining.   Steri-strips, if applicable can be removed as they fall off on their own naturally.     RESTRICTIONS:  Continue to wear your brace as directed   No lifting greater than 10 pounds, no repetitive bending or twisting  No driving for two weeks, no driving while on narcotic medication or muscle relaxers  No aspirin, blood thinners, NSAIDS (non-steroidal anti-inflammatory medications - aleve, motrin, ibuprofen, celebrex) for two weeks     RECOMMENDATIONS:  Over the counter stool softeners daily while on narcotics  Ambulate often to prevent blood clots in your legs  Continue incentive spirometer hourly   Follow up at [facility] in 2 weeks after surgery.  Please call [phone] to confirm the date, location, and time of your follow up appointment that was made for you.  Cervical Fusion  The neck is the upper portion of your spine. The 7 bones in your neck are referred to as the cervical spine. Cervical fusion is a type of surgery that is done on the cervical spine to relieve pressure on the spinal cord or one or more nerve roots. There are two types of cervical fusion:  · Anterior cervical fusion. This surgery is done through the front (anterior) part of your neck. During the surgery the affected intervertebral disk is removed to take pressure off the nerves or spinal cord. The area where the disc was removed is filled with a bone graft that causes the vertebral bodies to grow together (fuse) over time.  · Posterior cervical fusion. This surgery is done through the back (posterior) of the neck. The surgery joins two or more neck vertebrae into one solid section of bone. Posterior cervical fusion is most commonly used to treat neck fractures and dislocations and to fix deformities in the curve of the neck.  LET YOUR HEALTH CARE  PROVIDER KNOW ABOUT:  1. Any allergies you have.  2. All medicines you are taking, including vitamins, herbs, eyedrops, creams, and over-the-counter medicines.  3. Previous problems you or members of your family have had with the use of anesthetics.  4. Any blood disorders or blood clotting problems you have.  5. Previous surgeries you have had.  6. Medical conditions you have.  RISKS AND COMPLICATIONS  Generally, this is a safe procedure. However, as with any procedure, problems can occur. Possible problems include:   1. Infection.    2. Bleeding with possible need for blood transfusion.    3. Injury to surrounding structures, including nerves.    4. Leakage of cerebrospinal fluid.    5. Blood clots.  6. Temporary breathing difficulties after surgery.  7. Extended hospital stay, especially with posterior cervical fusion.  BEFORE THE PROCEDURE  1. Do not eat or drink for 6-8 hours before the procedure.    2. Take medicines as directed by your surgeon. Ask your surgeon about changing or stopping your regular medicines.    3. You will be given antibiotic medicines to keep the infection rate down.    4. The surgical cut (incision) site on your neck will be marked.    5. Your neck will be cleaned to reduce the risk of infection.  PROCEDURE   The length of the procedure depends on what needs to be done. It usually takes 2 or more hours. For both procedures, you will be given medicine to make you sleep (general anesthetic). A breathing tube will be placed down your throat.   Anterior Cervical Fusion   1. An incision will usually be made in a skin fold line at the front of your neck, in the area where the fusion will be placed.   2. The neck muscles will be pushed aside.    3. The surgeon will remove the affected, degenerated disk and bone spurs (decompression). This helps to take the pressure off the nerves and spinal cord.    4. The area where the disk was removed is then filled with a plastic spacer implant, bone graft,  or both. These implants and bone grafts take the place of the disk and keep the nerve passageway open and clear for the nerves and spinal cord.    5. In most cases, the surgeon will put metal plates, pins, or screws (hardware) in the neck to help stabilize the surgical site and to keep the implants and bone grafts in place. The hardware reduces motion at the surgical site, so the bones can grow together. This provides extra support to the neck.    Posterior Cervical Fusion   · An incision will be made through the back of the neck.    · Two or more neck vertebrae will be joined into one solid section of bone.    · Metal plates and pins or screws may be placed in the neck. These help stabilize the neck, providing extra support to help the bones to grow together more easily.  AFTER THE PROCEDURE  · You will stay in a recovery area until the anesthesia has worn off. Your blood pressure and pulse will be checked often.    · You may continue to receive fluids and medicines, such as antibiotics, through the IV tube for several days after the surgery.    · You may need to wear a neck or back brace for several weeks after surgery, especially when up and out of bed.    · You may be given pain medicine while still in the recovery area. Some pain is normal, but if your pain gets worse, tell your surgeon or nurse.    · Be up and moving as soon as possible after surgery. Physical therapists will help you start walking.    · To prevent blood clots in your legs:  ¨ You may be given compression stockings to wear.    ¨ You may need to take medicine to prevent clots.  · You may be asked to do breathing exercises. This is to prevent a lung infection.    · Most people stay in the hospital for 1-3 days after this surgery.       This information is not intended to replace advice given to you by your health care provider. Make sure you discuss any questions you have with your health care provider.     Document Released: 06/09/2003 Document  Revised: 12/23/2014 Document Reviewed: 06/19/2014  [employer] Patient Education ©2016 [employer]  Discharge Instructions    Discharged to home by car with relative. Discharged via walking, hospital escort: Yes.  Special equipment needed: C-Collar    Be sure to schedule a follow-up appointment with your primary care doctor or any specialists as instructed.     Discharge Plan:   Influenza Vaccine Indication: Not indicated: Previously immunized this influenza season and > 8 years of age    I understand that a diet low in cholesterol, fat, and sodium is recommended for good health. Unless I have been given specific instructions below for another diet, I accept this instruction as my diet prescription.   Other diet: Regular    Special Instructions: None    · Is patient discharged on Warfarin / Coumadin?   No     · Is patient Post Blood Transfusion?  No    Depression / Suicide Risk    As you are discharged from this [facility] facility, it is important to learn how to keep safe from harming yourself.    Recognize the warning signs:  · Abrupt changes in personality, positive or negative- including increase in energy   · Giving away possessions  · Change in eating patterns- significant weight changes-  positive or negative  · Change in sleeping patterns- unable to sleep or sleeping all the time   · Unwillingness or inability to communicate  · Depression  · Unusual sadness, discouragement and loneliness  · Talk of wanting to die  · Neglect of personal appearance   · Rebelliousness- reckless behavior  · Withdrawal from people/activities they love  · Confusion- inability to concentrate     If you or a loved one observes any of these behaviors or has concerns about self-harm, here's what you can do:  · Talk about it- your feelings and reasons for harming yourself  · Remove any means that you might use to hurt yourself (examples: pills, rope, extension cords, firearm)  · Get professional help from the community  (Mental Health, Substance Abuse, psychological counseling)  · Do not be alone:Call your Safe Contact- someone whom you trust who will be there for you.  · Call your local CRISIS HOTLINE 835-7538 or 902-649-2544  · Call your local Children's Mobile Crisis Response Team [place] (478) 271-6685 or www.Zoomy  · Call the toll free National Suicide Prevention Hotlines   · National Suicide Prevention Lifeline 063-680-WSRM (1512)  · National Hope Line Network 800-SUICIDE (176-2204)

## 2017-10-04 ENCOUNTER — HOSPITAL ENCOUNTER (OUTPATIENT)
Facility: MEDICAL CENTER | Age: 52
End: 2017-10-04
Attending: NEUROLOGICAL SURGERY | Admitting: NEUROLOGICAL SURGERY
Payer: COMMERCIAL

## 2017-11-19 ENCOUNTER — HOSPITAL ENCOUNTER (INPATIENT)
Facility: MEDICAL CENTER | Age: 52
LOS: 3 days | DRG: 460 | End: 2017-11-22
Attending: NEUROLOGICAL SURGERY | Admitting: NEUROLOGICAL SURGERY
Payer: COMMERCIAL

## 2017-11-19 ENCOUNTER — APPOINTMENT (OUTPATIENT)
Dept: RADIOLOGY | Facility: MEDICAL CENTER | Age: 52
DRG: 460 | End: 2017-11-19
Attending: NEUROLOGICAL SURGERY
Payer: COMMERCIAL

## 2017-11-19 PROCEDURE — A4314 CATH W/DRAINAGE 2-WAY LATEX: HCPCS | Performed by: NEUROLOGICAL SURGERY

## 2017-11-19 PROCEDURE — 90686 IIV4 VACC NO PRSV 0.5 ML IM: CPT | Performed by: NEUROLOGICAL SURGERY

## 2017-11-19 PROCEDURE — 160009 HCHG ANES TIME/MIN: Performed by: NEUROLOGICAL SURGERY

## 2017-11-19 PROCEDURE — 500444 HCHG HEMOSTAT, SURGICEL 2X3: Performed by: NEUROLOGICAL SURGERY

## 2017-11-19 PROCEDURE — 700102 HCHG RX REV CODE 250 W/ 637 OVERRIDE(OP): Performed by: PHYSICIAN ASSISTANT

## 2017-11-19 PROCEDURE — 110371 HCHG SHELL REV 272: Performed by: NEUROLOGICAL SURGERY

## 2017-11-19 PROCEDURE — 92610 EVALUATE SWALLOWING FUNCTION: CPT

## 2017-11-19 PROCEDURE — A9270 NON-COVERED ITEM OR SERVICE: HCPCS

## 2017-11-19 PROCEDURE — 0RP104Z REMOVAL OF INTERNAL FIXATION DEVICE FROM CERVICAL VERTEBRAL JOINT, OPEN APPROACH: ICD-10-PCS | Performed by: NEUROLOGICAL SURGERY

## 2017-11-19 PROCEDURE — A9270 NON-COVERED ITEM OR SERVICE: HCPCS | Performed by: PHYSICIAN ASSISTANT

## 2017-11-19 PROCEDURE — 72020 X-RAY EXAM OF SPINE 1 VIEW: CPT

## 2017-11-19 PROCEDURE — 770001 HCHG ROOM/CARE - MED/SURG/GYN PRIV*

## 2017-11-19 PROCEDURE — 500075 HCHG BLADE, CLIPPER NEURO: Performed by: NEUROLOGICAL SURGERY

## 2017-11-19 PROCEDURE — 700111 HCHG RX REV CODE 636 W/ 250 OVERRIDE (IP)

## 2017-11-19 PROCEDURE — 90471 IMMUNIZATION ADMIN: CPT

## 2017-11-19 PROCEDURE — 700101 HCHG RX REV CODE 250

## 2017-11-19 PROCEDURE — 502000 HCHG MISC OR IMPLANTS RC 0278: Performed by: NEUROLOGICAL SURGERY

## 2017-11-19 PROCEDURE — 700111 HCHG RX REV CODE 636 W/ 250 OVERRIDE (IP): Performed by: PHYSICIAN ASSISTANT

## 2017-11-19 PROCEDURE — 700102 HCHG RX REV CODE 250 W/ 637 OVERRIDE(OP)

## 2017-11-19 PROCEDURE — 01N10ZZ RELEASE CERVICAL NERVE, OPEN APPROACH: ICD-10-PCS | Performed by: NEUROLOGICAL SURGERY

## 2017-11-19 PROCEDURE — 160041 HCHG SURGERY MINUTES - EA ADDL 1 MIN LEVEL 4: Performed by: NEUROLOGICAL SURGERY

## 2017-11-19 PROCEDURE — 110454 HCHG SHELL REV 250: Performed by: NEUROLOGICAL SURGERY

## 2017-11-19 PROCEDURE — 160048 HCHG OR STATISTICAL LEVEL 1-5: Performed by: NEUROLOGICAL SURGERY

## 2017-11-19 PROCEDURE — 700111 HCHG RX REV CODE 636 W/ 250 OVERRIDE (IP): Performed by: NEUROLOGICAL SURGERY

## 2017-11-19 PROCEDURE — 160029 HCHG SURGERY MINUTES - 1ST 30 MINS LEVEL 4: Performed by: NEUROLOGICAL SURGERY

## 2017-11-19 PROCEDURE — 160002 HCHG RECOVERY MINUTES (STAT): Performed by: NEUROLOGICAL SURGERY

## 2017-11-19 PROCEDURE — A6222 GAUZE <=16 IN NO W/SAL W/O B: HCPCS | Performed by: NEUROLOGICAL SURGERY

## 2017-11-19 PROCEDURE — 0RG4071 FUSION OF CERVICOTHORACIC VERTEBRAL JOINT WITH AUTOLOGOUS TISSUE SUBSTITUTE, POSTERIOR APPROACH, POSTERIOR COLUMN, OPEN APPROACH: ICD-10-PCS | Performed by: NEUROLOGICAL SURGERY

## 2017-11-19 PROCEDURE — 500122 HCHG BOVIE, BLADE: Performed by: NEUROLOGICAL SURGERY

## 2017-11-19 PROCEDURE — 95940 IONM IN OPERATNG ROOM 15 MIN: CPT | Performed by: NEUROLOGICAL SURGERY

## 2017-11-19 PROCEDURE — G8996 SWALLOW CURRENT STATUS: HCPCS | Mod: CK

## 2017-11-19 PROCEDURE — 3E01340 INTRODUCTION OF INFLUENZA VACCINE INTO SUBCUTANEOUS TISSUE, PERCUTANEOUS APPROACH: ICD-10-PCS | Performed by: NEUROLOGICAL SURGERY

## 2017-11-19 PROCEDURE — 160036 HCHG PACU - EA ADDL 30 MINS PHASE I: Performed by: NEUROLOGICAL SURGERY

## 2017-11-19 PROCEDURE — G8997 SWALLOW GOAL STATUS: HCPCS | Mod: CI

## 2017-11-19 PROCEDURE — 500331 HCHG COTTONOID, SURG PATTIE: Performed by: NEUROLOGICAL SURGERY

## 2017-11-19 PROCEDURE — 501838 HCHG SUTURE GENERAL: Performed by: NEUROLOGICAL SURGERY

## 2017-11-19 PROCEDURE — 0RG2071 FUSION OF 2 OR MORE CERVICAL VERTEBRAL JOINTS WITH AUTOLOGOUS TISSUE SUBSTITUTE, POSTERIOR APPROACH, POSTERIOR COLUMN, OPEN APPROACH: ICD-10-PCS | Performed by: NEUROLOGICAL SURGERY

## 2017-11-19 PROCEDURE — 700105 HCHG RX REV CODE 258: Performed by: PHYSICIAN ASSISTANT

## 2017-11-19 PROCEDURE — 0RG6071 FUSION OF THORACIC VERTEBRAL JOINT WITH AUTOLOGOUS TISSUE SUBSTITUTE, POSTERIOR APPROACH, POSTERIOR COLUMN, OPEN APPROACH: ICD-10-PCS | Performed by: NEUROLOGICAL SURGERY

## 2017-11-19 PROCEDURE — 160035 HCHG PACU - 1ST 60 MINS PHASE I: Performed by: NEUROLOGICAL SURGERY

## 2017-11-19 DEVICE — IMPLANTABLE DEVICE: Type: IMPLANTABLE DEVICE | Status: FUNCTIONAL

## 2017-11-19 DEVICE — DURASEAL SEALANT SYSTEM 5ML - (5/BX): Type: IMPLANTABLE DEVICE | Status: FUNCTIONAL

## 2017-11-19 DEVICE — CAP LOCKING QUARTEX THREADED LOCKING (1TCONX24=24): Type: IMPLANTABLE DEVICE | Status: FUNCTIONAL

## 2017-11-19 RX ORDER — DEXAMETHASONE SODIUM PHOSPHATE 4 MG/ML
4 INJECTION, SOLUTION INTRA-ARTICULAR; INTRALESIONAL; INTRAMUSCULAR; INTRAVENOUS; SOFT TISSUE EVERY 6 HOURS
Status: COMPLETED | OUTPATIENT
Start: 2017-11-19 | End: 2017-11-19

## 2017-11-19 RX ORDER — SODIUM CHLORIDE 9 MG/ML
INJECTION, SOLUTION INTRAVENOUS CONTINUOUS
Status: DISCONTINUED | OUTPATIENT
Start: 2017-11-19 | End: 2017-11-22 | Stop reason: HOSPADM

## 2017-11-19 RX ORDER — BUPIVACAINE HYDROCHLORIDE AND EPINEPHRINE 5; 5 MG/ML; UG/ML
INJECTION, SOLUTION EPIDURAL; INTRACAUDAL; PERINEURAL
Status: DISCONTINUED | OUTPATIENT
Start: 2017-11-19 | End: 2017-11-19 | Stop reason: HOSPADM

## 2017-11-19 RX ORDER — BUTALBITAL, ACETAMINOPHEN AND CAFFEINE 50; 325; 40 MG/1; MG/1; MG/1
1 TABLET ORAL EVERY 4 HOURS PRN
Status: DISCONTINUED | OUTPATIENT
Start: 2017-11-19 | End: 2017-11-22 | Stop reason: HOSPADM

## 2017-11-19 RX ORDER — HYDROMORPHONE HYDROCHLORIDE 2 MG/ML
INJECTION, SOLUTION INTRAMUSCULAR; INTRAVENOUS; SUBCUTANEOUS
Status: COMPLETED
Start: 2017-11-19 | End: 2017-11-19

## 2017-11-19 RX ORDER — BACITRACIN ZINC 500 [USP'U]/G
OINTMENT TOPICAL
Status: DISCONTINUED | OUTPATIENT
Start: 2017-11-19 | End: 2017-11-19 | Stop reason: HOSPADM

## 2017-11-19 RX ORDER — POLYETHYLENE GLYCOL 3350 17 G/17G
1 POWDER, FOR SOLUTION ORAL 2 TIMES DAILY PRN
Status: DISCONTINUED | OUTPATIENT
Start: 2017-11-19 | End: 2017-11-22 | Stop reason: HOSPADM

## 2017-11-19 RX ORDER — DEXAMETHASONE SODIUM PHOSPHATE 4 MG/ML
INJECTION, SOLUTION INTRA-ARTICULAR; INTRALESIONAL; INTRAMUSCULAR; INTRAVENOUS; SOFT TISSUE
Status: COMPLETED
Start: 2017-11-19 | End: 2017-11-19

## 2017-11-19 RX ORDER — ONDANSETRON 4 MG/1
4 TABLET, ORALLY DISINTEGRATING ORAL EVERY 4 HOURS PRN
Status: DISCONTINUED | OUTPATIENT
Start: 2017-11-19 | End: 2017-11-22 | Stop reason: HOSPADM

## 2017-11-19 RX ORDER — CALCIUM CARBONATE 500 MG/1
500 TABLET, CHEWABLE ORAL 2 TIMES DAILY
Status: DISCONTINUED | OUTPATIENT
Start: 2017-11-19 | End: 2017-11-22 | Stop reason: HOSPADM

## 2017-11-19 RX ORDER — ONDANSETRON 2 MG/ML
4 INJECTION INTRAMUSCULAR; INTRAVENOUS EVERY 4 HOURS PRN
Status: DISCONTINUED | OUTPATIENT
Start: 2017-11-19 | End: 2017-11-22 | Stop reason: HOSPADM

## 2017-11-19 RX ORDER — DIPHENHYDRAMINE HCL 25 MG
25 TABLET ORAL EVERY 6 HOURS PRN
Status: DISCONTINUED | OUTPATIENT
Start: 2017-11-19 | End: 2017-11-22 | Stop reason: HOSPADM

## 2017-11-19 RX ORDER — ENEMA 19; 7 G/133ML; G/133ML
1 ENEMA RECTAL
Status: DISCONTINUED | OUTPATIENT
Start: 2017-11-19 | End: 2017-11-22 | Stop reason: HOSPADM

## 2017-11-19 RX ORDER — LISINOPRIL 20 MG/1
10 TABLET ORAL
Status: DISCONTINUED | OUTPATIENT
Start: 2017-11-19 | End: 2017-11-22 | Stop reason: HOSPADM

## 2017-11-19 RX ORDER — DIPHENHYDRAMINE HYDROCHLORIDE 50 MG/ML
25 INJECTION INTRAMUSCULAR; INTRAVENOUS EVERY 6 HOURS PRN
Status: DISCONTINUED | OUTPATIENT
Start: 2017-11-19 | End: 2017-11-22 | Stop reason: HOSPADM

## 2017-11-19 RX ORDER — SODIUM CHLORIDE, SODIUM LACTATE, POTASSIUM CHLORIDE, AND CALCIUM CHLORIDE .6; .31; .03; .02 G/100ML; G/100ML; G/100ML; G/100ML
IRRIGANT IRRIGATION
Status: DISCONTINUED | OUTPATIENT
Start: 2017-11-19 | End: 2017-11-19 | Stop reason: HOSPADM

## 2017-11-19 RX ORDER — DOCUSATE SODIUM 100 MG/1
100 CAPSULE, LIQUID FILLED ORAL 2 TIMES DAILY
Status: DISCONTINUED | OUTPATIENT
Start: 2017-11-19 | End: 2017-11-22 | Stop reason: HOSPADM

## 2017-11-19 RX ORDER — PROMETHAZINE HYDROCHLORIDE 25 MG/1
12.5-25 SUPPOSITORY RECTAL EVERY 4 HOURS PRN
Status: DISCONTINUED | OUTPATIENT
Start: 2017-11-19 | End: 2017-11-22 | Stop reason: HOSPADM

## 2017-11-19 RX ORDER — ACETAMINOPHEN 500 MG
500 TABLET ORAL EVERY 6 HOURS
Status: DISCONTINUED | OUTPATIENT
Start: 2017-11-19 | End: 2017-11-20

## 2017-11-19 RX ORDER — ZOLPIDEM TARTRATE 5 MG/1
5 TABLET ORAL
Status: DISCONTINUED | OUTPATIENT
Start: 2017-11-20 | End: 2017-11-22 | Stop reason: HOSPADM

## 2017-11-19 RX ORDER — BISACODYL 10 MG
10 SUPPOSITORY, RECTAL RECTAL
Status: DISCONTINUED | OUTPATIENT
Start: 2017-11-19 | End: 2017-11-22 | Stop reason: HOSPADM

## 2017-11-19 RX ORDER — TOPIRAMATE 25 MG/1
25 TABLET ORAL 4 TIMES DAILY
Status: DISCONTINUED | OUTPATIENT
Start: 2017-11-19 | End: 2017-11-19

## 2017-11-19 RX ORDER — METHOCARBAMOL 750 MG/1
750 TABLET, FILM COATED ORAL EVERY 8 HOURS PRN
Status: DISCONTINUED | OUTPATIENT
Start: 2017-11-19 | End: 2017-11-22 | Stop reason: HOSPADM

## 2017-11-19 RX ORDER — CARISOPRODOL 350 MG/1
350 TABLET ORAL EVERY 8 HOURS PRN
Status: DISCONTINUED | OUTPATIENT
Start: 2017-11-19 | End: 2017-11-22 | Stop reason: HOSPADM

## 2017-11-19 RX ORDER — AMOXICILLIN 250 MG
1 CAPSULE ORAL NIGHTLY
Status: DISCONTINUED | OUTPATIENT
Start: 2017-11-19 | End: 2017-11-19

## 2017-11-19 RX ORDER — PROMETHAZINE HYDROCHLORIDE 25 MG/1
12.5-25 TABLET ORAL EVERY 4 HOURS PRN
Status: DISCONTINUED | OUTPATIENT
Start: 2017-11-19 | End: 2017-11-22 | Stop reason: HOSPADM

## 2017-11-19 RX ORDER — AMOXICILLIN 250 MG
1 CAPSULE ORAL
Status: DISCONTINUED | OUTPATIENT
Start: 2017-11-19 | End: 2017-11-22 | Stop reason: HOSPADM

## 2017-11-19 RX ORDER — AMOXICILLIN 250 MG
2 CAPSULE ORAL 2 TIMES DAILY
Status: DISCONTINUED | OUTPATIENT
Start: 2017-11-19 | End: 2017-11-22 | Stop reason: HOSPADM

## 2017-11-19 RX ORDER — CLONIDINE HYDROCHLORIDE 0.1 MG/1
0.1 TABLET ORAL EVERY 4 HOURS PRN
Status: DISCONTINUED | OUTPATIENT
Start: 2017-11-19 | End: 2017-11-22 | Stop reason: HOSPADM

## 2017-11-19 RX ADMIN — DEXAMETHASONE SODIUM PHOSPHATE 4 MG: 4 INJECTION, SOLUTION INTRAMUSCULAR; INTRAVENOUS at 23:41

## 2017-11-19 RX ADMIN — INFLUENZA A VIRUS A/MICHIGAN/45/2015 X-275 (H1N1) ANTIGEN (FORMALDEHYDE INACTIVATED), INFLUENZA A VIRUS A/HONG KONG/4801/2014 X-263B (H3N2) ANTIGEN (FORMALDEHYDE INACTIVATED), INFLUENZA B VIRUS B/PHUKET/3073/2013 ANTIGEN (FORMALDEHYDE INACTIVATED), AND INFLUENZA B VIRUS B/BRISBANE/60/2008 ANTIGEN (FORMALDEHYDE INACTIVATED) 0.5 ML: 15; 15; 15; 15 INJECTION, SUSPENSION INTRAMUSCULAR at 16:10

## 2017-11-19 RX ADMIN — HYDROMORPHONE HYDROCHLORIDE: 2 INJECTION INTRAMUSCULAR; INTRAVENOUS; SUBCUTANEOUS at 12:27

## 2017-11-19 RX ADMIN — SODIUM CHLORIDE: 9 INJECTION, SOLUTION INTRAVENOUS at 13:54

## 2017-11-19 RX ADMIN — ACETAMINOPHEN 500 MG: 500 TABLET ORAL at 23:41

## 2017-11-19 RX ADMIN — DEXAMETHASONE SODIUM PHOSPHATE 4 MG: 4 INJECTION, SOLUTION INTRAMUSCULAR; INTRAVENOUS at 17:29

## 2017-11-19 RX ADMIN — STANDARDIZED SENNA CONCENTRATE AND DOCUSATE SODIUM 2 TABLET: 8.6; 5 TABLET, FILM COATED ORAL at 19:54

## 2017-11-19 RX ADMIN — ANTACID TABLETS 500 MG: 500 TABLET, CHEWABLE ORAL at 19:54

## 2017-11-19 RX ADMIN — ACETAMINOPHEN 500 MG: 500 TABLET ORAL at 17:29

## 2017-11-19 RX ADMIN — DEXTROSE MONOHYDRATE 3 G: 50 INJECTION, SOLUTION INTRAVENOUS at 16:07

## 2017-11-19 RX ADMIN — DEXAMETHASONE SODIUM PHOSPHATE 4 MG: 4 INJECTION, SOLUTION INTRA-ARTICULAR; INTRALESIONAL; INTRAMUSCULAR; INTRAVENOUS; SOFT TISSUE at 12:05

## 2017-11-19 RX ADMIN — HYDROMORPHONE HYDROCHLORIDE 0.2 MG: 2 INJECTION INTRAMUSCULAR; INTRAVENOUS; SUBCUTANEOUS at 11:28

## 2017-11-19 RX ADMIN — DEXTROSE MONOHYDRATE 3 G: 50 INJECTION, SOLUTION INTRAVENOUS at 23:41

## 2017-11-19 RX ADMIN — DEXAMETHASONE SODIUM PHOSPHATE 4 MG: 4 INJECTION, SOLUTION INTRAMUSCULAR; INTRAVENOUS at 12:05

## 2017-11-19 RX ADMIN — HYDROMORPHONE HYDROCHLORIDE 0.2 MG: 2 INJECTION INTRAMUSCULAR; INTRAVENOUS; SUBCUTANEOUS at 12:05

## 2017-11-19 RX ADMIN — SODIUM CHLORIDE: 9 INJECTION, SOLUTION INTRAVENOUS at 23:49

## 2017-11-19 ASSESSMENT — PAIN SCALES - GENERAL
PAINLEVEL_OUTOF10: 4
PAINLEVEL_OUTOF10: 4
PAINLEVEL_OUTOF10: 5
PAINLEVEL_OUTOF10: 3
PAINLEVEL_OUTOF10: 8
PAINLEVEL_OUTOF10: 6
PAINLEVEL_OUTOF10: 3
PAINLEVEL_OUTOF10: 4
PAINLEVEL_OUTOF10: 6

## 2017-11-19 ASSESSMENT — LIFESTYLE VARIABLES
EVER HAD A DRINK FIRST THING IN THE MORNING TO STEADY YOUR NERVES TO GET RID OF A HANGOVER: NO
EVER_SMOKED: YES
HAVE PEOPLE ANNOYED YOU BY CRITICIZING YOUR DRINKING: NO
TOTAL SCORE: 0
DO YOU DRINK ALCOHOL: YES
HOW MANY TIMES IN THE PAST YEAR HAVE YOU HAD 5 OR MORE DRINKS IN A DAY: 0
HAVE YOU EVER FELT YOU SHOULD CUT DOWN ON YOUR DRINKING: NO
EVER FELT BAD OR GUILTY ABOUT YOUR DRINKING: NO
CONSUMPTION TOTAL: NEGATIVE
TOTAL SCORE: 0
AVERAGE NUMBER OF DAYS PER WEEK YOU HAVE A DRINK CONTAINING ALCOHOL: 1
TOTAL SCORE: 0
ON A TYPICAL DAY WHEN YOU DRINK ALCOHOL HOW MANY DRINKS DO YOU HAVE: 1

## 2017-11-19 ASSESSMENT — PATIENT HEALTH QUESTIONNAIRE - PHQ9
SUM OF ALL RESPONSES TO PHQ QUESTIONS 1-9: 0
2. FEELING DOWN, DEPRESSED, IRRITABLE, OR HOPELESS: NOT AT ALL
1. LITTLE INTEREST OR PLEASURE IN DOING THINGS: NOT AT ALL
SUM OF ALL RESPONSES TO PHQ9 QUESTIONS 1 AND 2: 0

## 2017-11-19 NOTE — OR SURGEON
Immediate Post OP Note    PreOp Diagnosis: Recent C4,5 ACDF + plate, C3,4 Kippel Feil, Old C5-7 ACDF, Recent C7-T1 ACDF + plate, active left C8 radiculopathy on EMG, C7-T1 instability    PostOp Diagnosis: same    Procedure(s):  CERVICAL FUSION POSTERIOR O-ARM- C5-T2 W/INSTRUMENTATION - Wound Class: Clean with Drain  CERVICAL LAMINECTOMY POSTERIOR- C7-T1 - Wound Class: Clean with Drain  HARDWARE REMOVAL NEURO- C5-7 - Wound Class: Clean with Drain    Surgeon(s):  Mannie Odonnell M.D.    Anesthesiologist/Type of Anesthesia:  Anesthesiologist: Haider Cervantes M.D./General    Surgical Staff:  Assistant: Angelica Rivas P.A.-C.  Circulator: Pili Molina R.N.  Relief Circulator: Gabriella Kim R.N.  Scrub Person: Matheus Callaway; Temitope Ritchie  Radiology Technologist: Meet Galvan    Specimens:  * No specimens in log *    Estimated Blood Loss: minimal    Findings: stenosis relieved rostral to left C8 nerve root, nice placement of hardware, bilateral T1 screws stimulated at > 20 mamps, stable ssep's, emg's, mep's    Complications: none        11/19/2017 10:45 AM Mannie Odonnell

## 2017-11-19 NOTE — OP REPORT
DATE OF SERVICE:  11/19/2017    PREOPERATIVE DIAGNOSES:  Recent C4-C5 ACDF with plate, C3-C4 Klippel-Feil   deformity, old C5-C7 ACDF, recent C5-C7 plate removal, recent C7-T1 ACDF with   plate, active left C8 radiculopathy on EMG, C7-T1 stenosis and instability.    POSTOPERATIVE DIAGNOSES:  Recent C4-C5 ACDF with plate, C3-C4 Klippel-Feil   deformity, old C5-C7 ACDF, recent C5-C7 plate removal, recent C7-T1 ACDF with   plate, active left C8 radiculopathy on EMG, C7-T1 stenosis and instability.    PRINCIPAL PROCEDURES PERFORMED:  1.  C5-C7 posterior segmental instrumentation removal.  2.  C4-C5, C7-T1, T1-T2 posterolateral fusion with use of local autograft and   also tricalcium phosphate Kinex putty made by [employer].  3.  C7-T1 posterior nonsegmental instrumentation with use of a [employer]   lateral mass screws.  On the left, C7-T1 instrumentation was performed; on the   right, C6-T1 instrumentation and fusion was performed.  4.  O-arm navigation for placement of hardware.    SURGEON:  Mannie Odonnell MD    ASSISTANT:  Angelica Rivas PA-C    ANESTHESIA:  The procedure was performed under general anesthesia.    ANESTHESIOLOGIST:  Haider Cervantes MD    COMPLICATIONS:  There were no complications.    FINDINGS:  Include:  1.  Evidence of stenosis seen at C7-T1 on the left side, rostral to the exit   at the C8 nerve root.  2.  C7-T1 instability.  3.  No loosening or fracture of hardware from C5-C7.  Bilateral C5, C6, and C7   lateral mass screws were removed.  This was the De[employer] eegoeseer system.  4.  Micro motion appreciated at C4-C5, posterolaterally.  5.  Nice placement of screws at C7-T1 on the left and at C6-T1 on the right.  6.  Use of O-arm.  7.  Stable SSEPs, EMGs, MEPs throughout the case.  Remote monitoring was   performed by neuromonitoring associates.  8.  Bilateral T1 screws stimulating greater than 20 milliamps.    For IV fluids, urine output, estimated blood loss,  please see the  anesthesia   record.    DISPOSITION:  Patient will be extubated and brought to the recovery room.    CLINICAL HISTORY:  The patient is a 52-year-old male who presents with a left   C8 radiculopathy and significant interossei weakness and left C8 weakness.    The patient had been seen at another practice before coming over to me.  I   performed a C7-T1 ACDF with plate.  His insurance delayed my scheduling the   posterior portion, which was originally authorized by the insurance.    Ultimately, we received clearance to perform the posterior portion of the   surgery.  The patient had an active EMG performed 3 weeks postop demonstrating   continued active C8 radiculopathy, as his left hand was getting worse.    Risks, benefits, and options were discussed.  The patient had extensive   anterior and posterior prior surgery.  The patient signed a written informed   consent.  Again, risks, benefits, and options were discussed extensively.    DESCRIPTION OF PROCEDURE:  The patient was brought to the operating room and   placed under general anesthesia.  Please note that a 22 modifier will be added   to the fusion code, as the patient's BMI is 40 and this required extra time   and expertise not only in positioning, but also demonstrating the correct   level and then also using the o-arm.  After the patient was positioned, which   required an extra 15-20 minutes given the patient's habitus, the patient was   taped down appropriately and the head was kept in a neutral position.  The   patient's face was gently padded in a prone view.  All pressure points were   padded.  The suboccipital area along with the neck was shaved, prepped and   draped in the usual sterile fashion.  A time-out was performed.  Local   anesthetic was infiltrated into the old incision.  I opened up the middle and   bottom part of the old incision and extended it by an inch.  The ligamentum   nuchae was divided and the thoracolumbar fascia was appreciated as  well.  A   sharp subperiosteal dissection was performed.  The old hardware was   appreciated.  Great care was taken not to have any pressure into the posterior   portion of the canal.  The old setscrews were removed and the john was   removed.  [employer] system and a 2.0 hex was used in order to remove   the De[employer] screws.  There were no fracture or damage or lucency or   osteolysis seen around the screws.  Next, the C4-C5 facet joint was visualized   and there was some motion there.  The facet joint was decorticated by using   an AMA drillbit and local autograft, and also tricalcium phosphate K[employer] putty   was used for a C4-C5 posterolateral fusion to allow a global fusion at that   area.  Next, attention was paid to C7-T1.  The facet joint was visualized and   decorticated by using an AMA drillbit and an upbiting curette.  A T2 spinous   process clamp was used and the o-arm was registered for neuronavigation and   instrumentation placement.  Bilateral T1 screws were cannulated.  The left C7   screw hole and the right C6 screw hole were deepened to 16 mm.  A ball-tip   probe demonstrated no evidence of cortical violation.  Next, I placed a right   C6 screw and a right T1 screw, and I placed a left C7 and a left T1 screw.    The bilateral T1 screw stimulated greater than 20 milliamps.  Next, I placed   the appropriate sized rods and I tightened the setscrews to the appropriate   torque.  AP fluoroscopy demonstrated nice placement of hardware.  Lateral   fluoroscopy was not performed as the patient's shoulders were too big.  This   played into the patient's BMI of 40 and the patient being 5 feet 10 and 283   pounds.  The facet joints were further decorticated and I packed with local   autograft and also tricalcium phosphate Kinex putty.  I used a Kerrison 2 and   Kerrison 3 punch to relieve significant stenosis, which was not appreciated on   the MRI.  There was scar tissue and stenosis pushing just  rostral on the left   side of the spinal canal near the C8 nerve root.  In addition, an extensive   foraminotomy was performed to demonstrate that the left C8 nerve root was   completely decompressed.  Perfect hemostasis was achieved.  I placed some blue   glue over the surgical site in order to decrease the development of scar   tissue, which could potentially impinge upon this area.  Next, perfect   hemostasis was achieved.  A subfascial drain was tunneled through a separate   stab wound.  MEPs were stable throughout the case.  SSEPs and EMGs were stable   throughout the case.  The wound was closed in anatomic layers and a sterile   dressing was applied.  The patient will be extubated and brought to the   recovery room.       ____________________________________     MD DENNY CISNEROS / YOON    DD:  11/19/2017 10:55:21  DT:  11/19/2017 11:47:13    D#:  [id number]  Job#:  [id number]

## 2017-11-19 NOTE — THERAPY
"  Speech Language Therapy Clinical Swallow Evaluation completed.  Functional Status:   Pharyngeal swallow trigger is at times effortful with some independent double swallow of bolus, however pt functional for consumption of thins/nectars and purees with consumption of 12 oz of these textures without difficulty or s/sx. With minimal PO trials of soft solids he choked on a small piece and coughed it back up, stating \"this is what happens.\"  He appears to be at the level for a full liquid diet at this time and SLP is following to assess for any possible changes in oropharyngeal function with post-surgical swelling, and to upgrade diet as appropriate.    Recommendations - Diet: Full Liquid Diet                           Strategies: Monitor during meals                          Medication Administration: Crush all Medications in Puree  Plan of Care: Will benefit from Speech Therapy 3 times per week  Post-Acute Therapy: Discharge to home with outpatient or home health for additional skilled therapy services.    See \"Rehab Therapy-Acute\" Patient Summary Report for complete documentation. Thank you for the consult.   "

## 2017-11-20 PROCEDURE — G8987 SELF CARE CURRENT STATUS: HCPCS | Mod: CI

## 2017-11-20 PROCEDURE — A9270 NON-COVERED ITEM OR SERVICE: HCPCS | Performed by: PHYSICIAN ASSISTANT

## 2017-11-20 PROCEDURE — G8978 MOBILITY CURRENT STATUS: HCPCS | Mod: CI

## 2017-11-20 PROCEDURE — 770001 HCHG ROOM/CARE - MED/SURG/GYN PRIV*

## 2017-11-20 PROCEDURE — 700112 HCHG RX REV CODE 229: Performed by: PHYSICIAN ASSISTANT

## 2017-11-20 PROCEDURE — 700105 HCHG RX REV CODE 258: Performed by: PHYSICIAN ASSISTANT

## 2017-11-20 PROCEDURE — 700102 HCHG RX REV CODE 250 W/ 637 OVERRIDE(OP): Performed by: PHYSICIAN ASSISTANT

## 2017-11-20 PROCEDURE — 97161 PT EVAL LOW COMPLEX 20 MIN: CPT

## 2017-11-20 PROCEDURE — G8988 SELF CARE GOAL STATUS: HCPCS | Mod: CI

## 2017-11-20 PROCEDURE — 97165 OT EVAL LOW COMPLEX 30 MIN: CPT

## 2017-11-20 PROCEDURE — 700111 HCHG RX REV CODE 636 W/ 250 OVERRIDE (IP): Performed by: PHYSICIAN ASSISTANT

## 2017-11-20 PROCEDURE — G8989 SELF CARE D/C STATUS: HCPCS | Mod: CI

## 2017-11-20 PROCEDURE — 700102 HCHG RX REV CODE 250 W/ 637 OVERRIDE(OP): Performed by: NURSE PRACTITIONER

## 2017-11-20 PROCEDURE — G8979 MOBILITY GOAL STATUS: HCPCS | Mod: CI

## 2017-11-20 PROCEDURE — G8980 MOBILITY D/C STATUS: HCPCS | Mod: CI

## 2017-11-20 PROCEDURE — A9270 NON-COVERED ITEM OR SERVICE: HCPCS | Performed by: NURSE PRACTITIONER

## 2017-11-20 RX ORDER — OXYCODONE AND ACETAMINOPHEN 10; 325 MG/1; MG/1
1-2 TABLET ORAL EVERY 4 HOURS PRN
Status: DISCONTINUED | OUTPATIENT
Start: 2017-11-20 | End: 2017-11-22 | Stop reason: HOSPADM

## 2017-11-20 RX ADMIN — STANDARDIZED SENNA CONCENTRATE AND DOCUSATE SODIUM 2 TABLET: 8.6; 5 TABLET, FILM COATED ORAL at 20:01

## 2017-11-20 RX ADMIN — DOCUSATE SODIUM 100 MG: 100 CAPSULE ORAL at 12:15

## 2017-11-20 RX ADMIN — OXYCODONE HYDROCHLORIDE AND ACETAMINOPHEN 2 TABLET: 10; 325 TABLET ORAL at 08:42

## 2017-11-20 RX ADMIN — VITAMIN D, TAB 1000IU (100/BT) 5000 UNITS: 25 TAB at 08:38

## 2017-11-20 RX ADMIN — SODIUM CHLORIDE: 9 INJECTION, SOLUTION INTRAVENOUS at 10:20

## 2017-11-20 RX ADMIN — LISINOPRIL 10 MG: 20 TABLET ORAL at 08:37

## 2017-11-20 RX ADMIN — STANDARDIZED SENNA CONCENTRATE AND DOCUSATE SODIUM 2 TABLET: 8.6; 5 TABLET, FILM COATED ORAL at 08:38

## 2017-11-20 RX ADMIN — OXYCODONE HYDROCHLORIDE AND ACETAMINOPHEN 2 TABLET: 10; 325 TABLET ORAL at 17:10

## 2017-11-20 RX ADMIN — METHOCARBAMOL 750 MG: 750 TABLET ORAL at 20:01

## 2017-11-20 RX ADMIN — OXYCODONE HYDROCHLORIDE AND ACETAMINOPHEN 2 TABLET: 10; 325 TABLET ORAL at 22:29

## 2017-11-20 RX ADMIN — ANTACID TABLETS 500 MG: 500 TABLET, CHEWABLE ORAL at 08:37

## 2017-11-20 RX ADMIN — DOCUSATE SODIUM 100 MG: 100 CAPSULE ORAL at 22:29

## 2017-11-20 RX ADMIN — ACETAMINOPHEN 500 MG: 500 TABLET ORAL at 05:02

## 2017-11-20 RX ADMIN — OXYCODONE HYDROCHLORIDE AND ACETAMINOPHEN 2 TABLET: 10; 325 TABLET ORAL at 12:50

## 2017-11-20 RX ADMIN — ANTACID TABLETS 500 MG: 500 TABLET, CHEWABLE ORAL at 20:01

## 2017-11-20 RX ADMIN — HYDROMORPHONE HYDROCHLORIDE 5 MG: 2 INJECTION INTRAMUSCULAR; INTRAVENOUS; SUBCUTANEOUS at 04:32

## 2017-11-20 ASSESSMENT — COGNITIVE AND FUNCTIONAL STATUS - GENERAL
SUGGESTED CMS G CODE MODIFIER MOBILITY: CJ
CLIMB 3 TO 5 STEPS WITH RAILING: A LITTLE
WALKING IN HOSPITAL ROOM: A LITTLE
MOBILITY SCORE: 22
DAILY ACTIVITIY SCORE: 23
HELP NEEDED FOR BATHING: A LITTLE
SUGGESTED CMS G CODE MODIFIER DAILY ACTIVITY: CI

## 2017-11-20 ASSESSMENT — ACTIVITIES OF DAILY LIVING (ADL): TOILETING: INDEPENDENT

## 2017-11-20 ASSESSMENT — PATIENT HEALTH QUESTIONNAIRE - PHQ9
1. LITTLE INTEREST OR PLEASURE IN DOING THINGS: NOT AT ALL
2. FEELING DOWN, DEPRESSED, IRRITABLE, OR HOPELESS: NOT AT ALL
SUM OF ALL RESPONSES TO PHQ QUESTIONS 1-9: 0
SUM OF ALL RESPONSES TO PHQ9 QUESTIONS 1 AND 2: 0

## 2017-11-20 ASSESSMENT — GAIT ASSESSMENTS
GAIT LEVEL OF ASSIST: SUPERVISED
DISTANCE (FEET): 500
DEVIATION: BRADYKINETIC;SHUFFLED GAIT

## 2017-11-20 ASSESSMENT — PAIN SCALES - GENERAL
PAINLEVEL_OUTOF10: 4
PAINLEVEL_OUTOF10: 4
PAINLEVEL_OUTOF10: 6
PAINLEVEL_OUTOF10: 4
PAINLEVEL_OUTOF10: 5
PAINLEVEL_OUTOF10: 7
PAINLEVEL_OUTOF10: 6
PAINLEVEL_OUTOF10: 5
PAINLEVEL_OUTOF10: 7
PAINLEVEL_OUTOF10: 2

## 2017-11-20 NOTE — PROGRESS NOTES
Recd report, assumed care. Pt A&O*4, c/o pain, see MAR. VSS. Assessment complete. LOWRY. Soft collar on. Dressing to back CDI. Family at bedside. Fall precautions in place, call light in reach, white board updated, hourly rounding in place. Will continue to monitor.

## 2017-11-20 NOTE — CARE PLAN
Problem: Communication  Goal: The ability to communicate needs accurately and effectively will improve  Outcome: PROGRESSING AS EXPECTED  Patient able to communicate needs effectively and uses call light appropriately. RN hourly rounding in place.     Problem: Infection  Goal: Will remain free from infection  Outcome: PROGRESSING AS EXPECTED  No S/Sx of infection. Hand hygiene performed before and after patient contact. Patient educated on hand hygiene and infection prevention.

## 2017-11-20 NOTE — DIETARY
NUTRITION SERVICES: BMI - Pt with BMI >40 (= 40.74). Weight loss counseling not appropriate in acute care setting.     RECOMMEND - Referral to outpatient nutrition services for weight management after D/C.

## 2017-11-20 NOTE — THERAPY
"Occupational Therapy Evaluation completed.   Functional Status:  Pt educated on spinal precautions, spv supine>sit EOB and for mobility no AD. Spv LB dressing, SBA standing at sink to complete grooming tasks and to don/doff soft c-collar. Pt does demonstrate decreased L  strength, decreased AROM in L hand with finger flexion/extension, and decreased LUE sensation, which was all baseline prior to surgery. 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 Pt left up in chair with chair alarm on, call light in reach.  Plan of Care: Patient with no further skilled OT needs in the acute care setting at this time  Discharge Recommendations:  Equipment: No Equipment Needed. Post-acute therapy Could benefit from outpatient OT for L hand deficits.    51 y/o male s/p cervical surgery. Pt demonstrates functional balance, activity tolerance, and use of BUE allowing him to complete transfers and ADLs. Pt does demonstrate decreased strength, ROM, and sensation in L hand which was his baseline prior to surgery and could benefit from outpatient OT. Does not demonstrate need for further acute OT.    See \"Rehab Therapy-Acute\" Patient Summary Report for complete documentation.    "

## 2017-11-20 NOTE — THERAPY
"Physical Therapy Evaluation completed.   Bed Mobility:  Supine to Sit: Supervised  Transfers: Sit to Stand: Supervised  Gait: Level Of Assist: Supervised with No Equipment Needed       Plan of Care: Patient with no further skilled PT needs in the acute care setting at this time  Discharge Recommendations: Equipment: No Equipment Needed. Post-acute therapy Discharge to home with outpatient or home health for additional skilled therapy services.    See \"Rehab Therapy-Acute\" Patient Summary Report for complete documentation.   pt moving well and feels he can go home and do all necessary mobility. pt has help from wife as needed. pt is very familiar w/ spinal prec from prior surgeries. pt educated on cervical packet and sponal prec. pt does not like the soft collar and plans to use his hard collar that he has at home, once he gets home. Pt has chewing tobaccor in room and tobacco all over in linens. tobacco taken from pt and given to Rn to lock up and pt educated on [facility] policy. no further needs for skilled acute PT services.   "

## 2017-11-20 NOTE — PROGRESS NOTES
Neurosurgery Progress Note    Subjective:  Doing well. Has postop pain, says PCA not working very well.   Tolerating full liquid diet.  Gallo out, voiding.   Passing gas, last BM yesterday before surgery.   Ambulating  No BUE pain.   No increased numbness/weakness of LUE  Seen by SLP yesterday for some dysphagia after his ACDF    Exam:  VSS  A&Ox4, NAD  No hoarseness of voice.   Trachea midline - no coughing or choking while drinking water   No nuchal rigidity   NM RUE: 5/5 deltoid, biceps, triceps, handgrip, intrinsics   NM LUE: 5/5 deltoid, biceps, triceps, 4/5 handgrip, 1/5 intrinsics   Sensation decreased to light touch ulnar > radial hand/forearm of LUE, otherwise intact and equal throughout upper extremities   Abdomen: soft, non-tender  Pulmonary: non-labored breathing on room air, normal respiratory effort  No LE edema, erythema, cyanosis, clubbing  Calves non-tender to compression bilat  Incision CDI, no halo sign   Soft C-collar being worn appropriately  Drain: 160ml/12 hours       BP  Min: 108/62  Max: 145/88  Pulse  Av.5  Min: 55  Max: 96  Resp  Av.3  Min: 12  Max: 22  Temp  Av °C (98.6 °F)  Min: 36.7 °C (98.1 °F)  Max: 37.3 °C (99.2 °F)  SpO2  Av.2 %  Min: 91 %  Max: 99 %    No Data Recorded        No results for input(s): SODIUM, POTASSIUM, CHLORIDE, CO2, GLUCOSE, BUN, CPKTOTAL in the last 72 hours.            Intake/Output       17 0700 - 17 0659 17 0700 - 17 0659      4089-0721 7465-8211 Total 2951-6365 1266-6606 Total       Intake    I.V.  7482  1304 3130  --  -- --    Crystalloid Intake 1800 -- 1800 -- -- --    PCA End of Shift Total Volume (ml) 21 59 80 -- -- --    IV Piggyback Volume (ANCEF) -- 100 100 -- -- --    IV Volume (NS Infusion) -- 1150 1150 -- -- --    Total Intake  1304 0140 -- -- --       Output    Urine  1825  1600 3425  --  -- --    Indwelling Cathether 1625 -- 1625 -- -- --    Void (ml) 200 1600 1800 -- -- --    Drains  105  160 265   --  -- --    Hemovac 1 105 160 265 -- -- --    Stool  --  -- --  --  -- --    Number of Times Stooled -- 1 x 1 x -- -- --    Blood  200  -- 200  --  -- --    Est. Blood Loss (mL) 200 -- 200 -- -- --    Total Output 2130 1760 3890 -- -- --       Net I/O     -309 -451 -760 -- -- --            Intake/Output Summary (Last 24 hours) at 11/20/17 0801  Last data filed at 11/20/17 0657   Gross per 24 hour   Intake             3130 ml   Output             3890 ml   Net             -760 ml            • acetaminophen/caffeine/butalbital 325-40-50 mg  1 Tab Q4HRS PRN   • carisoprodol  350 mg Q8HRS PRN   • lisinopril  10 mg Q DAY   • senna-docusate  2 Tab BID   • Pharmacy Consult Request  1 Each PRN   • MD ALERT...Do not administer NSAIDS or ASPIRIN unless ORDERED By Neurosurgery  1 Each PRN   • docusate sodium  100 mg BID   • senna-docusate  1 Tab Q24HRS PRN   • polyethylene glycol/lytes  1 Packet BID PRN   • magnesium hydroxide  30 mL QDAY PRN   • bisacodyl  10 mg Q24HRS PRN   • fleet  1 Each Once PRN   • NS   Continuous   • acetaminophen  500 mg Q6HRS   • HYDROmorphone   Continuous   • methocarbamol  750 mg Q8HRS PRN   • diphenhydrAMINE  25 mg Q6HRS PRN    Or   • diphenhydrAMINE  25 mg Q6HRS PRN   • ondansetron  4 mg Q4HRS PRN   • ondansetron  4 mg Q4HRS PRN   • promethazine  12.5-25 mg Q4HRS PRN   • promethazine  12.5-25 mg Q4HRS PRN   • prochlorperazine  5-10 mg Q4HRS PRN   • zolpidem  5 mg HS PRN - MR X 1   • vitamin D  5,000 Units DAILY   • calcium carbonate  500 mg BID   • benzocaine-menthol  1 Lozenge Q2HRS PRN   • clonidine  0.1 mg Q4HRS PRN       Assessment and Plan:  POD # 1 s/p YUKI C5-7, C6-T1 instrumentation/fusion.     Plan  Ambulate, mobilize as able within restrictions  C collar at all times  Full liquid diet per SLP   D/C PCA  Add Percocet  I/S  Follow output and D/C drain per protocol

## 2017-11-20 NOTE — CARE PLAN
Problem: Pain Management  Goal: Pain level will decrease to patient's comfort goal  Dilaudid pca dc'd, percocets started, pt states pain controlled with PO meds provided    Problem: Safety  Goal: Will remain free from injury  Fall precautions in place, bed alarm on, call light in reach, white board updated, hourly rounding in place. Will continue to monitor.

## 2017-11-21 PROCEDURE — 700111 HCHG RX REV CODE 636 W/ 250 OVERRIDE (IP): Performed by: NURSE PRACTITIONER

## 2017-11-21 PROCEDURE — A9270 NON-COVERED ITEM OR SERVICE: HCPCS | Performed by: PHYSICIAN ASSISTANT

## 2017-11-21 PROCEDURE — 700102 HCHG RX REV CODE 250 W/ 637 OVERRIDE(OP): Performed by: NURSE PRACTITIONER

## 2017-11-21 PROCEDURE — 700102 HCHG RX REV CODE 250 W/ 637 OVERRIDE(OP): Performed by: PHYSICIAN ASSISTANT

## 2017-11-21 PROCEDURE — 770001 HCHG ROOM/CARE - MED/SURG/GYN PRIV*

## 2017-11-21 PROCEDURE — 92526 ORAL FUNCTION THERAPY: CPT

## 2017-11-21 PROCEDURE — A9270 NON-COVERED ITEM OR SERVICE: HCPCS | Performed by: NURSE PRACTITIONER

## 2017-11-21 RX ADMIN — OXYCODONE HYDROCHLORIDE AND ACETAMINOPHEN 2 TABLET: 10; 325 TABLET ORAL at 21:53

## 2017-11-21 RX ADMIN — CEFAZOLIN SODIUM 1 G: 1 INJECTION, SOLUTION INTRAVENOUS at 09:19

## 2017-11-21 RX ADMIN — METHOCARBAMOL 750 MG: 750 TABLET ORAL at 21:19

## 2017-11-21 RX ADMIN — CEFAZOLIN SODIUM 1 G: 1 INJECTION, SOLUTION INTRAVENOUS at 21:18

## 2017-11-21 RX ADMIN — CEFAZOLIN SODIUM 1 G: 1 INJECTION, SOLUTION INTRAVENOUS at 14:10

## 2017-11-21 RX ADMIN — CARISOPRODOL 350 MG: 350 TABLET ORAL at 04:56

## 2017-11-21 RX ADMIN — CARISOPRODOL 350 MG: 350 TABLET ORAL at 13:15

## 2017-11-21 RX ADMIN — CARISOPRODOL 350 MG: 350 TABLET ORAL at 21:53

## 2017-11-21 RX ADMIN — ANTACID TABLETS 500 MG: 500 TABLET, CHEWABLE ORAL at 21:19

## 2017-11-21 RX ADMIN — LISINOPRIL 10 MG: 20 TABLET ORAL at 09:10

## 2017-11-21 RX ADMIN — OXYCODONE HYDROCHLORIDE AND ACETAMINOPHEN 2 TABLET: 10; 325 TABLET ORAL at 17:55

## 2017-11-21 RX ADMIN — VITAMIN D, TAB 1000IU (100/BT) 5000 UNITS: 25 TAB at 09:10

## 2017-11-21 RX ADMIN — ZOLPIDEM TARTRATE 5 MG: 5 TABLET, FILM COATED ORAL at 21:53

## 2017-11-21 RX ADMIN — OXYCODONE HYDROCHLORIDE AND ACETAMINOPHEN 2 TABLET: 10; 325 TABLET ORAL at 09:05

## 2017-11-21 RX ADMIN — OXYCODONE HYDROCHLORIDE AND ACETAMINOPHEN 2 TABLET: 10; 325 TABLET ORAL at 04:56

## 2017-11-21 RX ADMIN — ZOLPIDEM TARTRATE 5 MG: 5 TABLET, FILM COATED ORAL at 21:19

## 2017-11-21 RX ADMIN — ANTACID TABLETS 500 MG: 500 TABLET, CHEWABLE ORAL at 09:11

## 2017-11-21 RX ADMIN — OXYCODONE HYDROCHLORIDE AND ACETAMINOPHEN 2 TABLET: 10; 325 TABLET ORAL at 13:15

## 2017-11-21 ASSESSMENT — PAIN SCALES - GENERAL
PAINLEVEL_OUTOF10: 7
PAINLEVEL_OUTOF10: 6
PAINLEVEL_OUTOF10: 2
PAINLEVEL_OUTOF10: 6
PAINLEVEL_OUTOF10: 4

## 2017-11-21 NOTE — PROGRESS NOTES
Neurosurgery Progress Note    Subjective:  Doing well. Pain managed   Tolerating full liquid diet.  Gallo out, voiding.   Passing gas, last BM before surgery.   Ambulating  Has some Lt shoulder discomfort today   No increased numbness/weakness of LUE      Exam:  VSS  A&Ox4, NAD  No hoarseness of voice.   Trachea midline - no coughing or choking while drinking water   No nuchal rigidity   NM RUE: 5/5 deltoid, biceps, triceps, handgrip, intrinsics   NM LUE: 5/5 deltoid, biceps, triceps, 4/5 handgrip, 4-/5 T1, 2/5 C8   Sensation decreased to light touch ulnar > radial hand/forearm of LUE, otherwise intact and equal throughout upper extremities   Abdomen: soft, non-tender  Pulmonary: non-labored breathing on room air, normal respiratory effort  No LE edema, erythema, cyanosis, clubbing  Calves non-tender to compression bilat  Incision CDI, no halo sign   Soft C-collar being worn appropriately  Drain: 100ml/12 hours       BP  Min: 107/55  Max: 119/71  Pulse  Av  Min: 53  Max: 86  Resp  Av.8  Min: 16  Max: 18  Temp  Av.8 °C (98.3 °F)  Min: 36.3 °C (97.3 °F)  Max: 37.2 °C (99 °F)  SpO2  Av.2 %  Min: 92 %  Max: 98 %    No Data Recorded        No results for input(s): SODIUM, POTASSIUM, CHLORIDE, CO2, GLUCOSE, BUN, CPKTOTAL in the last 72 hours.            Intake/Output       17 07 - 17 0659 17 07 - 17 0659       Total  Total       Intake    P.O.  --  50 50  --  -- --    P.O. -- 50 50 -- -- --    Total Intake -- 50 50 -- -- --       Output    Urine  --  550 550  --  -- --    Void (ml) -- 550 550 -- -- --    Drains  110  100 210  --  -- --    Hemovac 1 110 100 210 -- -- --    Total Output 110 650 760 -- -- --       Net I/O     -110 -600 -710 -- -- --            Intake/Output Summary (Last 24 hours) at 17 0820  Last data filed at 17 0600   Gross per 24 hour   Intake               50 ml   Output              760 ml   Net              -710 ml            • oxycodone-acetaminophen  1-2 Tab Q4HRS PRN   • acetaminophen/caffeine/butalbital 325-40-50 mg  1 Tab Q4HRS PRN   • carisoprodol  350 mg Q8HRS PRN   • lisinopril  10 mg Q DAY   • senna-docusate  2 Tab BID   • Pharmacy Consult Request  1 Each PRN   • MD ALERT...Do not administer NSAIDS or ASPIRIN unless ORDERED By Neurosurgery  1 Each PRN   • docusate sodium  100 mg BID   • senna-docusate  1 Tab Q24HRS PRN   • polyethylene glycol/lytes  1 Packet BID PRN   • magnesium hydroxide  30 mL QDAY PRN   • bisacodyl  10 mg Q24HRS PRN   • fleet  1 Each Once PRN   • NS   Continuous   • methocarbamol  750 mg Q8HRS PRN   • diphenhydrAMINE  25 mg Q6HRS PRN    Or   • diphenhydrAMINE  25 mg Q6HRS PRN   • ondansetron  4 mg Q4HRS PRN   • ondansetron  4 mg Q4HRS PRN   • promethazine  12.5-25 mg Q4HRS PRN   • promethazine  12.5-25 mg Q4HRS PRN   • prochlorperazine  5-10 mg Q4HRS PRN   • zolpidem  5 mg HS PRN - MR X 1   • vitamin D  5,000 Units DAILY   • calcium carbonate  500 mg BID   • benzocaine-menthol  1 Lozenge Q2HRS PRN   • clonidine  0.1 mg Q4HRS PRN       Assessment and Plan:  POD # 2 s/p YUKI C5-7, C6-T1 instrumentation/fusion.     Plan  Ambulate, mobilize as able within restrictions  Soft collar PRN  Full liquid diet per SLP   I/S  Follow output and D/C drain per protocol  Plan to continue ABX until drain out and then a short course of PO abx on d/c  Follow LUE sx  D/C planning for home in am if doing well and drain out.     Seen with Dr. Odonnell.

## 2017-11-21 NOTE — CARE PLAN
Problem: Pain Management  Goal: Pain level will decrease to patient's comfort goal  Outcome: PROGRESSING AS EXPECTED      Problem: Mobility  Goal: Risk for activity intolerance will decrease  Outcome: PROGRESSING AS EXPECTED

## 2017-11-21 NOTE — PROGRESS NOTES
Late entry: Pt is stable in room with bed alarm on. Pt has one hemovac and a surgical incision along the posterior with dressing in place. Pt is continent of urine and stated his last bowel movement was 11/20/2019.Pt is alert and oriented x4 and is able to paulino & fc. Pt has weakness of L Lower forearm and three digits of his L hand.

## 2017-11-21 NOTE — THERAPY
"Speech Language Therapy dysphagia treatment completed.   Functional Status:  Patient seen this date for dysphagia tx session per RN request. Per RN, patient is on full liquid diet and has been noncompliant with diet, as his wife brings him 3 meals a day of \"whatever he wants.\" Patient admitted to this and reported he has had solids everyday, including a pérez sandwich this morning. Patient noted to have wet/gurgly vocal quality prior to PO trials, which cleared with cough/throat clear. Patient reported that he has had prolonged dysphagia since his neck surgery in August and he does not feel that it is worse since this surgery. Patient reported he intermittently chokes on \"stringy\" things such as shredded beef, noodles, and pills frequently. Patient consumed PO trials of applesauce, pudding, soft solids, mixed consistencies, crackers, and thin liquids via cup sip and straw. Patient had no overt s/sx of aspiration on applesauce, pudding, and thin liquids. PO trials of soft solids and crackers resulted in c/o globus, patient coughing it up, and then attempting to swallow it back down. Patient reported \"this is what happens\" and felt like crackers \"got stuck\". Laryngeal elevation palpated as weak. Initiation of swallow trigger was timely. Patient was given handout on dysphagia exercises targeting BoT retraction, laryngeal elevation, and pharyngeal constriction and instructed on proper technique of each. At this time, recommend patient is at the level for Dys1/thins or full liquid diet and patient prefers full liquid diet. Patient reported that he will still eat solid foods, \"Because how can I not?\", but he and wife were provided with EXTENSIVE education regarding dysphagia, s/sx of aspiration, risks for pneumonia, SLP recs, foods allowed on diet, and recommendation for continued SLP services during acute stay and HH/outpatient SLP services. Patient and wife verbalized understanding and agreement of all education and all " "questions answered. RN aware. SLP is following.     Recommendations: At this time, recommend patient is at the level for Dys1/thins or full liquid diet. Patient prefers full liquid diet, but may have purees such as pudding, applesauce, mashed potatoes, etc.   Plan of Care: Will benefit from Speech Therapy 3 times per week during acute stay; patient would HIGHLY benefit from outpatient or  SLP services to target prolonged dysphagia   Post-Acute Therapy: Discharge to home with outpatient or home health for additional skilled therapy services.    See \"Rehab Therapy-Acute\" Patient Summary Report for complete documentation.     "

## 2017-11-21 NOTE — CARE PLAN
Problem: Urinary Elimination:  Goal: Ability to reestablish a normal urinary elimination pattern will improve    Intervention: Assist patient to sit on commode or toilet for voiding  Goal met.      Problem: Safety  Goal: Will remain free from injury    Intervention: Provide assistance with mobility  Goal met

## 2017-11-22 VITALS
RESPIRATION RATE: 20 BRPM | HEIGHT: 70 IN | TEMPERATURE: 98.2 F | OXYGEN SATURATION: 95 % | SYSTOLIC BLOOD PRESSURE: 121 MMHG | HEART RATE: 60 BPM | WEIGHT: 283.95 LBS | DIASTOLIC BLOOD PRESSURE: 81 MMHG | BODY MASS INDEX: 40.65 KG/M2

## 2017-11-22 PROCEDURE — 700105 HCHG RX REV CODE 258: Performed by: PHYSICIAN ASSISTANT

## 2017-11-22 PROCEDURE — 700102 HCHG RX REV CODE 250 W/ 637 OVERRIDE(OP): Performed by: NURSE PRACTITIONER

## 2017-11-22 PROCEDURE — A9270 NON-COVERED ITEM OR SERVICE: HCPCS | Performed by: NURSE PRACTITIONER

## 2017-11-22 PROCEDURE — 700102 HCHG RX REV CODE 250 W/ 637 OVERRIDE(OP): Performed by: PHYSICIAN ASSISTANT

## 2017-11-22 PROCEDURE — A9270 NON-COVERED ITEM OR SERVICE: HCPCS | Performed by: PHYSICIAN ASSISTANT

## 2017-11-22 PROCEDURE — 700111 HCHG RX REV CODE 636 W/ 250 OVERRIDE (IP): Performed by: NURSE PRACTITIONER

## 2017-11-22 RX ORDER — OXYCODONE AND ACETAMINOPHEN 10; 325 MG/1; MG/1
1-2 TABLET ORAL EVERY 4 HOURS PRN
Qty: 90 TAB | Refills: 0 | Status: SHIPPED | OUTPATIENT
Start: 2017-11-22 | End: 2020-03-31

## 2017-11-22 RX ORDER — AMOXICILLIN 250 MG
2 CAPSULE ORAL 2 TIMES DAILY
Qty: 60 TAB | Refills: 0 | Status: SHIPPED | OUTPATIENT
Start: 2017-11-22 | End: 2019-05-21

## 2017-11-22 RX ORDER — CEPHALEXIN 500 MG/1
500 CAPSULE ORAL 2 TIMES DAILY
Qty: 14 CAP | Refills: 0 | Status: SHIPPED | OUTPATIENT
Start: 2017-11-22 | End: 2017-11-29

## 2017-11-22 RX ORDER — CARISOPRODOL 350 MG/1
350 TABLET ORAL EVERY 8 HOURS PRN
Qty: 60 TAB | Refills: 0 | Status: SHIPPED | OUTPATIENT
Start: 2017-11-22 | End: 2022-06-09

## 2017-11-22 RX ADMIN — OXYCODONE HYDROCHLORIDE AND ACETAMINOPHEN 2 TABLET: 10; 325 TABLET ORAL at 05:01

## 2017-11-22 RX ADMIN — CEFAZOLIN SODIUM 1 G: 1 INJECTION, SOLUTION INTRAVENOUS at 05:05

## 2017-11-22 RX ADMIN — ANTACID TABLETS 500 MG: 500 TABLET, CHEWABLE ORAL at 09:15

## 2017-11-22 RX ADMIN — LISINOPRIL 10 MG: 20 TABLET ORAL at 09:15

## 2017-11-22 RX ADMIN — CARISOPRODOL 350 MG: 350 TABLET ORAL at 06:25

## 2017-11-22 RX ADMIN — VITAMIN D, TAB 1000IU (100/BT) 5000 UNITS: 25 TAB at 09:22

## 2017-11-22 RX ADMIN — SODIUM CHLORIDE: 9 INJECTION, SOLUTION INTRAVENOUS at 05:04

## 2017-11-22 RX ADMIN — OXYCODONE HYDROCHLORIDE AND ACETAMINOPHEN 2 TABLET: 10; 325 TABLET ORAL at 09:15

## 2017-11-22 ASSESSMENT — PAIN SCALES - GENERAL: PAINLEVEL_OUTOF10: 7

## 2017-11-22 ASSESSMENT — LIFESTYLE VARIABLES: EVER_SMOKED: YES

## 2017-11-22 NOTE — DISCHARGE INSTRUCTIONS
Discharge Instructions    Discharged to home by car with relative. Discharged via wheelchair, hospital escort: Yes.  Special equipment needed: C-Collar    Be sure to schedule a follow-up appointment with your primary care doctor or any specialists as instructed.     Discharge Plan:   Diet Plan: Discussed  Activity Level: Discussed  Smoking Cessation Offered: Patient Refused  Confirmed Follow up Appointment: Appointment Scheduled  Confirmed Symptoms Management: Discussed  Medication Reconciliation Updated: Yes  Influenza Vaccine Indication: Indicated: 9 to 64 years of age  Influenza Vaccine Given - only chart on this line when given: Influenza Vaccine Given (See MAR)    I understand that a diet low in cholesterol, fat, and sodium is recommended for good health. Unless I have been given specific instructions below for another diet, I accept this instruction as my diet prescription.   Other diet: regular full liquid diet    Special Instructions:     INCISION CARE:   OK to shower with incision covered or uncovered. After shower, pat incision dry and cover with gauze and tape if draining. OK to leave open to air if not draining.   Steri-strips, if applicable can be removed as they fall off on their own naturally.     RESTRICTIONS:   Continue to wear your brace as directed   No lifting greater than 10 pounds, no repetitive bending or twisting   No driving for two weeks, no driving while on narcotic medication or muscle relaxers   No aspirin, blood thinners, NSAIDS (non-steroidal anti-inflammatory medications - aleve, motrin, ibuprofen, celebrex) for two weeks     RECOMMENDATIONS:   Over the counter stool softeners daily while on narcotics   Ambulate often to prevent blood clots in your legs   Continue incentive spirometer hourly   Follow up at [facility] in 2 weeks after surgery.     Please call [phone] to confirm the date, location, and time of your follow up appointment that was made for you.    · Is  patient discharged on Warfarin / Coumadin?   No     · Is patient Post Blood Transfusion?  No    Depression / Suicide Risk    As you are discharged from this [facility] facility, it is important to learn how to keep safe from harming yourself.    Recognize the warning signs:  · Abrupt changes in personality, positive or negative- including increase in energy   · Giving away possessions  · Change in eating patterns- significant weight changes-  positive or negative  · Change in sleeping patterns- unable to sleep or sleeping all the time   · Unwillingness or inability to communicate  · Depression  · Unusual sadness, discouragement and loneliness  · Talk of wanting to die  · Neglect of personal appearance   · Rebelliousness- reckless behavior  · Withdrawal from people/activities they love  · Confusion- inability to concentrate     If you or a loved one observes any of these behaviors or has concerns about self-harm, here's what you can do:  · Talk about it- your feelings and reasons for harming yourself  · Remove any means that you might use to hurt yourself (examples: pills, rope, extension cords, firearm)  · Get professional help from the community (Mental Health, Substance Abuse, psychological counseling)  · Do not be alone:Call your Safe Contact- someone whom you trust who will be there for you.  · Call your local CRISIS HOTLINE 021-9702 or 841-729-0361  · Call your local Children's Mobile Crisis Response Team [place] (820) 197-0672 or www.Wheelright  · Call the toll free National Suicide Prevention Hotlines   · National Suicide Prevention Lifeline 296-111-VQTC (1468)  · National Hope Line Network 800-SUICIDE (108-4539)

## 2017-11-22 NOTE — CARE PLAN
Problem: Pain Management  Goal: Pain level will decrease to patient's comfort goal  Outcome: PROGRESSING AS EXPECTED      Problem: Safety  Goal: Will remain free from injury  Outcome: PROGRESSING AS EXPECTED      Problem: Infection  Goal: Will remain free from infection  Outcome: PROGRESSING AS EXPECTED

## 2017-11-22 NOTE — DISCHARGE SUMMARY
DATE OF ADMISSION:  11/19/2017    DATE OF DISCHARGE:  11/22/2017    ADMISSION DIAGNOSES:  Recent C4-C5 ACDF with plate, C3-C4 Klippel-Feil   deformity, old C5-C6 ACDF, recent C5-C6 plate removal, recent C7-T1 ACDF with   plate, active left C8 radiculopathy on EMG, C7-T1 stenosis and instability.    PRINCIPAL PROCEDURES PERFORMED:  1.  C5-C7 posterior segmental instrumentation removal.  2.  C4-C5, C7-T1, T1-T2 posterior lateral fusion with use of local autograft   and also tricalcium phosphate.  3.  C7-T1 posterior nonsegmental instrumentation with use of a [employer]   lateral mass screw.  3.  O-arm navigation for placement of hardware.    DISCHARGE DIAGNOSES:  Recent C4-C5 ACDF with plate, C3-C4 Klippel-Feil   deformity, old C5-C6 ACDF, recent C5-C7 plate removal, recent C7-T1 ACDF with   plate, active left C8 radiculopathy on EMG, C7-T1 stenosis and instability.    HOSPITAL COURSE:  The patient transitioned from the OR to the PACU to the   neurosurgical floor.  He progressed as anticipated without complication.  He   did have some dysphagia following his previous ACDF.  He was seen by SLP and   was tolerating a liquid diet.  Currently, the patient is ambulatory, his pain   is well managed on p.o. pain medications, his vital signs were stable.  His   drain is out, his Gallo is out, he has had a bowel movement.  He is ready to   go home.  He will be going home with the following physical exam:  VITAL SIGNS:  Stable.  GENERAL:  Alert and oriented x4, no apparent distress.  HEENT:  No hoarseness of the voice.  Trachea midline.  No coughing or choking   while drinking water.  No nuchal rigidity.  NEUROMUSCULAR:  Neuromuscularly, in the right upper extremity, he has 5/5   deltoid, biceps, triceps, hand , intrinsics.  Neuromuscularly, left upper   extremity:  5/5 deltoid, biceps, triceps, 4/5 hand , 4-/5 T1, 2/5 C8.    Sensation decreased to light touch ulnar greater than radial hand/forearm of   left upper  extremity, otherwise intact and equal throughout upper extremities.  ABDOMEN:  Soft and nontender.  PULMONARY:  Nonlabored breathing on room air, normal respiratory effort.  EXTREMITIES:  No lower extremity edema, erythema, cyanosis or clubbing.    Calves are nontender to compression bilaterally.  Incision is clean, dry, and   intact.  His soft C-collar is being worn appropriately.    DISCHARGE MEDICATIONS:  He will be discharged with the following medications:  1.  Percocet 10/325, quantity 90 for pain.  2.  Soma, quantity 60 for muscle spasms.  3.  Keflex 500 mg to be taken for 7 days twice daily for quantity of 14 for   prevention of infection.  4.  Stool softener for constipation, docusate sodium.    DISCHARGE INSTRUCTIONS:  As follows:  Okay to shower with incision covered or   uncovered.  After shower, pat incision dry and cover with gauze and tape if   draining.  Okay to leave open to air if not draining.  Steri-Strips if   applicable can be removed as a followup on their own naturally.  Restrictions:    Continue to wear brace as directed.  No lifting greater than 10 pounds, no   repetitive bending or twisting.  No driving for 2 weeks, no driving while on   narcotic medication or muscle relaxers.  No aspirin, blood thinners, NSAIDs   for 2 weeks.  Recommendations:  Over-the-counter stool softeners daily while   on narcotics.  Ambulate often to prevent blood clots in the legs.  Continue   incentive spirometry hourly while awake.  [facility]   in 2 weeks after surgery.  The patient may advance diet as tolerated.  We will   refer him for a speech and swallow outpatient evaluation.  Once again please   follow up in 2 weeks, do not hesitate to contact our office with any questions   or concerns.       ____________________________________     VICKIE Burkett    DD:  11/22/2017 10:10:28  DT:  11/22/2017 10:46:03    D#:  [id number]  Job#:  [id number]

## 2017-11-22 NOTE — PROGRESS NOTES
Discharge instructions and prescriptions given to pt and wife. All questions answered. Pt leaving via wheelchair with hospital escort and wife. All belongings taken.

## 2017-11-22 NOTE — PROGRESS NOTES
Received report from night shift RN. Assumed care of patient. Pt assessed and stable. VSS. Patient reports 6/10 pain at this time.  Administered medication for pain.  Discussed plan of care for day with patient and received verbal understanding. Call light within reach, strip alarm refused (pt up self), bed in low position.

## 2017-11-22 NOTE — CARE PLAN
Problem: Pain Management  Goal: Pain level will decrease to patient's comfort goal  Outcome: PROGRESSING AS EXPECTED  Pt tolerating current pain management regimen with PO meds and ambulation.    Problem: Mobility  Goal: Risk for activity intolerance will decrease  Outcome: PROGRESSING AS EXPECTED  Pt ambulating easily and with frequency. Bed alarm refused and pt is up self. Pt is very steady.

## 2017-11-22 NOTE — PROGRESS NOTES
Neurosurgery Progress Note    Subjective:  Doing well. Pain managed.  Voiding, ambulating.  Passing gas, last BM before surgery.   No increased numbness/weakness of LUE  Drain out  Reports difficulty continued difficulty swallowing. Tolerating thick liquid diet.    Exam:  VSS  A&Ox4, NAD  No hoarseness of voice.   Trachea midline - no coughing or choking while drinking water   No nuchal rigidity   NM RUE: 5/5 deltoid, biceps, triceps, handgrip, intrinsics   NM LUE: 5/5 deltoid, biceps, triceps, 4/5 handgrip, 4-/5 T1, 2/5 C8   Sensation decreased to light touch ulnar > radial hand/forearm of LUE, otherwise intact and equal throughout upper extremities   Abdomen: soft, non-tender  Pulmonary: non-labored breathing on room air, normal respiratory effort  No LE edema, erythema, cyanosis, clubbing  Calves non-tender to compression bilat  Incision CDI, no halo sign   Soft C-collar being worn appropriately    BP  Min: 115/77  Max: 126/81  Pulse  Av.8  Min: 58  Max: 66  Resp  Av.5  Min: 16  Max: 18  Temp  Av.5 °C (97.7 °F)  Min: 36 °C (96.8 °F)  Max: 36.8 °C (98.3 °F)  SpO2  Av %  Min: 95 %  Max: 98 %    No Data Recorded        No results for input(s): SODIUM, POTASSIUM, CHLORIDE, CO2, GLUCOSE, BUN, CPKTOTAL in the last 72 hours.            Intake/Output       17 07 - 17 0659 17 07 - 17 0659       Total  Total       Intake    P.O.  580  -- 580  --  -- --    P.O. 580 -- 580 -- -- --    Total Intake 580 -- 580 -- -- --       Output    Drains  65  90 155  --  -- --    Hemovac 1 65 90 155 -- -- --    Stool  --  -- --  --  -- --    Number of Times Stooled 0 x -- 0 x -- -- --    Total Output 65 90 155 -- -- --       Net I/O     515 -90 425 -- -- --            Intake/Output Summary (Last 24 hours) at 17 0849  Last data filed at 17 0600   Gross per 24 hour   Intake              580 ml   Output              155 ml   Net               425 ml            • ceFAZolin  1 g Q8HRS   • oxycodone-acetaminophen  1-2 Tab Q4HRS PRN   • acetaminophen/caffeine/butalbital 325-40-50 mg  1 Tab Q4HRS PRN   • carisoprodol  350 mg Q8HRS PRN   • lisinopril  10 mg Q DAY   • senna-docusate  2 Tab BID   • Pharmacy Consult Request  1 Each PRN   • MD ALERT...Do not administer NSAIDS or ASPIRIN unless ORDERED By Neurosurgery  1 Each PRN   • docusate sodium  100 mg BID   • senna-docusate  1 Tab Q24HRS PRN   • polyethylene glycol/lytes  1 Packet BID PRN   • magnesium hydroxide  30 mL QDAY PRN   • bisacodyl  10 mg Q24HRS PRN   • fleet  1 Each Once PRN   • NS   Continuous   • methocarbamol  750 mg Q8HRS PRN   • diphenhydrAMINE  25 mg Q6HRS PRN    Or   • diphenhydrAMINE  25 mg Q6HRS PRN   • ondansetron  4 mg Q4HRS PRN   • ondansetron  4 mg Q4HRS PRN   • promethazine  12.5-25 mg Q4HRS PRN   • promethazine  12.5-25 mg Q4HRS PRN   • prochlorperazine  5-10 mg Q4HRS PRN   • zolpidem  5 mg HS PRN - MR X 1   • vitamin D  5,000 Units DAILY   • calcium carbonate  500 mg BID   • benzocaine-menthol  1 Lozenge Q2HRS PRN   • clonidine  0.1 mg Q4HRS PRN       Assessment and Plan:  POD # 3 s/p YUKI C5-7, C6-T1 instrumentation/fusion.     Plan  Ambulate, mobilize as able within restrictions  Soft collar PRN  Full liquid diet per SLP, will refer for outpatient speech and swallow evaluation.   I/S  Will send home with short course of Keflex since drain continued for longer than anticipated.   Follow LUE sx    INCISION CARE:  OK to shower with incision covered or uncovered. After shower, pat incision dry and cover with gauze and tape if draining. OK to leave open to air if not draining.   Steri-strips, if applicable can be removed as they fall off on their own naturally.     RESTRICTIONS:  Continue to wear your brace as directed   No lifting greater than 10 pounds, no repetitive bending or twisting  No driving for two weeks, no driving while on narcotic medication or muscle relaxers  No aspirin,  blood thinners, NSAIDS (non-steroidal anti-inflammatory medications - aleve, motrin, ibuprofen, celebrex) for two weeks     RECOMMENDATIONS:  Over the counter stool softeners daily while on narcotics  Ambulate often to prevent blood clots in your legs  Continue incentive spirometer hourly   Follow up at [facility] in 2 weeks after surgery.    Please call [phone] to confirm the date, location, and time of your follow up appointment that was made for you.

## (undated) DEVICE — HEAD HOLDER JUNIOR/ADULT

## (undated) DEVICE — KIT SURGIFLO W/OUT THROMBIN - (6EA/CA)

## (undated) DEVICE — GLOVE BIOGEL PI INDICATOR SZ 8.0 SURGICAL PF LF -(50/BX 4BX/CA)

## (undated) DEVICE — KIT ROOM DECONTAMINATION

## (undated) DEVICE — HEMOSTAT SURG ABSORBABLE - 2 X 3 IN SURGICEL (24EA/CA)

## (undated) DEVICE — KIT EVACUATER 3 SPRING PVC LF 1/8 DRAIN SIZE (10EA/CA)"

## (undated) DEVICE — DRAPE LARGE 3 QUARTER - (20/CA)

## (undated) DEVICE — LACTATED RINGERS INJ 1000 ML - (14EA/CA 60CA/PF)

## (undated) DEVICE — TOOL DISSECT MATCH HEAD

## (undated) DEVICE — NEPTUNE 4 PORT MANIFOLD - (20/PK)

## (undated) DEVICE — DRAPE MICROSCOPE X-LONG (10EA/CA)

## (undated) DEVICE — CLOSURE SKIN STRIP 1/2 X 4 IN - (STERI STRIP) (50/BX 4BX/CA)

## (undated) DEVICE — SLEEVE, VASO, THIGH, MED

## (undated) DEVICE — SUTURE 0 VICRYL PLUS CT-1 - 8 X 18 INCH (12/BX)

## (undated) DEVICE — CHLORAPREP 26 ML APPLICATOR - ORANGE TINT(25/CA)

## (undated) DEVICE — PATTIES SURG NEURO X-RAY 1/2X1/2 (10EA/PK 20PK/CS)

## (undated) DEVICE — SHEET PEDIATRIC LAPAROTOMY - (10/CA)

## (undated) DEVICE — DRAPE 36X28IN RAD CARM BND BG - (25/CA) O

## (undated) DEVICE — DRILL BIT NAVIGATED 2.9MM

## (undated) DEVICE — CANISTER SUCTION 3000ML MECHANICAL FILTER AUTO SHUTOFF MEDI-VAC NONSTERILE LF DISP  (40EA/CA)

## (undated) DEVICE — SPHERE NAVIGATION STEALTH (5EA/TY 12TY/PK)

## (undated) DEVICE — SUTURE 4-0 MONOCRYL PLUS PS-2 - 27 INCH (36/BX)

## (undated) DEVICE — SUTURE 2-0 VICRYL PLUS CT-1 - 8 X 18 INCH(12/BX)

## (undated) DEVICE — MIDAS LUBRICATOR DIFFUSER PACK (4EA/CA)

## (undated) DEVICE — PROTECTOR ULNA NERVE - (36PR/CA)

## (undated) DEVICE — GLOVE SZ 8 BIOGEL PI MICRO - PF LF (50PR/BX)

## (undated) DEVICE — MASK ANESTHESIA ADULT  - (100/CA)

## (undated) DEVICE — DISSECT TOOL MIDAS F2/8TA23

## (undated) DEVICE — TUBING CLEARLINK DUO-VENT - C-FLO (48EA/CA)

## (undated) DEVICE — ELECTRODE DUAL RETURN W/ CORD - (50/PK)

## (undated) DEVICE — BLADE CLIPPER FITS 2501 CLIPPER (BLUE)  (20EA/CA)

## (undated) DEVICE — BIT DRILL 2.4MM

## (undated) DEVICE — CATHETER IV 20 GA X 1-1/4 ---SURG.& SDS ONLY--- (50EA/BX)

## (undated) DEVICE — SUCTION INSTRUMENT YANKAUER BULBOUS TIP W/O VENT (50EA/CA)

## (undated) DEVICE — PATTIES SURG X-RAYCOTTONOID - 1/2 X 3 IN (200/CA)

## (undated) DEVICE — GLOVE BIOGEL ECLIPSE  PF LATEX SIZE 6.5 (50PR/BX)

## (undated) DEVICE — DRESSING TRANSPARENT FILM TEGADERM 4 X 4.75" (50EA/BX)"

## (undated) DEVICE — TOWELS CLOTH SURGICAL - (4/PK 20PK/CA)

## (undated) DEVICE — GOWN SURGEONS X-LARGE - DISP. (30/CA)

## (undated) DEVICE — GOWN WARMING STANDARD FLEX - (30/CA)

## (undated) DEVICE — COVER MAYO STAND X-LG - (22EA/CA)

## (undated) DEVICE — SUTURE GENERAL

## (undated) DEVICE — TUBE CONNECT SUCTION CLEAR 120 X 1/4" (50EA/CA)"

## (undated) DEVICE — BOVIE BLADE COATED &INSULATED - 25/PK

## (undated) DEVICE — Device

## (undated) DEVICE — DRAPE SRG LG BCK TBL DISP - 10/CA

## (undated) DEVICE — NEEDLE NON SAFETY HYPO 22 GA X 1 1/2 IN (100/BX)

## (undated) DEVICE — DRESSING XEROFORM 1X8 - (50/BX 4BX/CA)

## (undated) DEVICE — SPONGE GAUZE STER 4X4 8-PL - (2/PK 50PK/BX 12BX/CS)

## (undated) DEVICE — TUBING C&T SET FLYING LEADS DRAIN TUBING (10EA/BX)

## (undated) DEVICE — GLOVE BIOGEL PI ORTHO SZ 8 PF LF (40PR/BX)

## (undated) DEVICE — GLOVE SZ 7 BIOGEL PI MICRO - PF LF (50PR/BX 4BX/CA)

## (undated) DEVICE — LACTATED RINGERS INJ. 500 ML - (24EA/CA)

## (undated) DEVICE — SENSOR SPO2 NEO LNCS ADHESIVE (20/BX) SEE USER NOTES

## (undated) DEVICE — KIT ANESTHESIA W/CIRCUIT & 3/LT BAG W/FILTER (20EA/CA)

## (undated) DEVICE — SODIUM CHL IRRIGATION 0.9% 1000ML (12EA/CA)

## (undated) DEVICE — GOWN SURGEONS LARGE - (32/CA)

## (undated) DEVICE — STERI STRIP COMPOUND BENZOIN - TINCTURE 0.6ML WITH APPLICATOR (40EA/BX)

## (undated) DEVICE — SET LEADWIRE 5 LEAD BEDSIDE DISPOSABLE ECG (1SET OF 5/EA)

## (undated) DEVICE — SURGIFOAM (12X7) - (12EA/CA)

## (undated) DEVICE — PACK NEURO - (2EA/CA)

## (undated) DEVICE — BIT DRILL 11MM

## (undated) DEVICE — GLOVE BIOGEL PI ORTHO SZ 8.5 PF LF (40/BX)

## (undated) DEVICE — SET EXTENSION WITH 2 PORTS (48EA/CA) ***PART #2C8610 IS A SUBSTITUTE*****

## (undated) DEVICE — NEEDLE SPINAL NON-SAFETY 18 GA X 3 IN (25EA/BX)

## (undated) DEVICE — SPONGE GAUZESTER 4 X 4 4PLY - (128PK/CA)

## (undated) DEVICE — CONTAINER SPECIMEN BAG OR - STERILE 4 OZ W/LID (100EA/CA)

## (undated) DEVICE — SUTURE 3-0 VICRYL PLUS RB-1 - 8 X 18 INCH (12/BX)

## (undated) DEVICE — BLANKET WARMING LOWER BODY - (10/CA) INACTIVE USE #8585

## (undated) DEVICE — SUTURE 4-0 VICRYL PLUS TF - 8 X 18 INCH (12/BX)

## (undated) DEVICE — TRAY CATHETER FOLEY URINE METER W/STATLOCK 350ML (10EA/CA)

## (undated) DEVICE — SPONGE PEANUT - (5/PK 50PK/CA)

## (undated) DEVICE — RESTRAINTS LIMB DISP. - (12/BX 4BX/CA)

## (undated) DEVICE — ELECTRODE 850 FOAM ADHESIVE - HYDROGEL RADIOTRNSPRNT (50/PK)

## (undated) DEVICE — SCREW DISTRACTION 14MM YELLOW - STERILE (10EA/BX) (5TX4=20)

## (undated) DEVICE — INTRAOP NEURO IN OR 1:1 PER 15 MIN

## (undated) DEVICE — BLADE SURGICAL CLIPPER - (50EA/CA)

## (undated) DEVICE — GLOVE BIOGEL PI ORTHO SZ 7.5 PF LF (40PR/BX)

## (undated) DEVICE — SYRINGE SAFETY 10 ML 18 GA X 1 1/2 BLUNT LL (100/BX 4BX/CA)

## (undated) DEVICE — DISSECT TOOL MIDAS REX